# Patient Record
Sex: FEMALE | Race: WHITE | NOT HISPANIC OR LATINO | Employment: OTHER | URBAN - METROPOLITAN AREA
[De-identification: names, ages, dates, MRNs, and addresses within clinical notes are randomized per-mention and may not be internally consistent; named-entity substitution may affect disease eponyms.]

---

## 2023-03-18 PROBLEM — M48.061 SPINAL STENOSIS OF LUMBAR REGION: Status: ACTIVE | Noted: 2023-03-18

## 2023-03-18 PROBLEM — M54.9 BACK PAIN: Status: ACTIVE | Noted: 2023-03-18

## 2023-03-18 PROBLEM — M54.16 LUMBAR RADICULITIS: Status: ACTIVE | Noted: 2023-03-18

## 2023-03-18 PROBLEM — L65.9 HAIR THINNING: Status: ACTIVE | Noted: 2023-03-18

## 2023-03-18 PROBLEM — G89.29 CHRONIC BILATERAL LOW BACK PAIN WITH LEFT-SIDED SCIATICA: Status: ACTIVE | Noted: 2023-03-18

## 2023-03-18 PROBLEM — F90.0 ATTENTION DEFICIT HYPERACTIVITY DISORDER (ADHD), PREDOMINANTLY INATTENTIVE TYPE: Status: ACTIVE | Noted: 2023-03-18

## 2023-03-18 PROBLEM — L60.3 BRITTLE NAILS: Status: ACTIVE | Noted: 2023-03-18

## 2023-03-18 PROBLEM — M54.50 LOWER BACK PAIN: Status: ACTIVE | Noted: 2023-03-18

## 2023-03-18 PROBLEM — G89.29 CHRONIC NECK PAIN: Status: ACTIVE | Noted: 2023-03-18

## 2023-03-18 PROBLEM — E78.2 MIXED HYPERLIPIDEMIA: Status: ACTIVE | Noted: 2023-03-18

## 2023-03-18 PROBLEM — R21 SKIN RASH: Status: ACTIVE | Noted: 2023-03-18

## 2023-03-18 PROBLEM — F41.8 DEPRESSION WITH ANXIETY: Status: ACTIVE | Noted: 2023-03-18

## 2023-03-18 PROBLEM — S32.000A COMPRESSION FRACTURE OF LUMBAR VERTEBRA (MULTI): Status: ACTIVE | Noted: 2023-03-18

## 2023-03-18 PROBLEM — M54.42 CHRONIC BILATERAL LOW BACK PAIN WITH LEFT-SIDED SCIATICA: Status: ACTIVE | Noted: 2023-03-18

## 2023-03-18 PROBLEM — M25.551 RIGHT HIP PAIN: Status: ACTIVE | Noted: 2023-03-18

## 2023-03-18 PROBLEM — M79.18 MYOFASCIAL PAIN: Status: ACTIVE | Noted: 2023-03-18

## 2023-03-18 PROBLEM — M47.816 LUMBAR SPONDYLOSIS: Status: ACTIVE | Noted: 2023-03-18

## 2023-03-18 PROBLEM — E66.09 OBESITY DUE TO EXCESS CALORIES: Status: ACTIVE | Noted: 2023-03-18

## 2023-03-18 PROBLEM — F41.8 SITUATIONAL ANXIETY: Status: ACTIVE | Noted: 2023-03-18

## 2023-03-18 PROBLEM — R13.10 PAIN ON SWALLOWING: Status: ACTIVE | Noted: 2023-03-18

## 2023-03-18 PROBLEM — R53.83 FATIGUE: Status: ACTIVE | Noted: 2023-03-18

## 2023-03-18 PROBLEM — K21.9 GERD (GASTROESOPHAGEAL REFLUX DISEASE): Status: ACTIVE | Noted: 2023-03-18

## 2023-03-18 PROBLEM — R19.5 POSITIVE COLORECTAL CANCER SCREENING USING COLOGUARD TEST: Status: ACTIVE | Noted: 2023-03-18

## 2023-03-18 PROBLEM — M54.2 CHRONIC NECK PAIN: Status: ACTIVE | Noted: 2023-03-18

## 2023-03-18 RX ORDER — BUPROPION HYDROCHLORIDE 150 MG/1
1 TABLET ORAL DAILY
COMMUNITY
Start: 2019-09-26 | End: 2023-06-26

## 2023-03-18 RX ORDER — TRIAMCINOLONE ACETONIDE 5 MG/G
1 CREAM TOPICAL
COMMUNITY
Start: 2022-04-25 | End: 2023-04-11 | Stop reason: ALTCHOICE

## 2023-03-18 RX ORDER — OMEPRAZOLE 20 MG/1
1 CAPSULE, DELAYED RELEASE ORAL
COMMUNITY
Start: 2020-03-03 | End: 2023-04-11 | Stop reason: ALTCHOICE

## 2023-03-18 RX ORDER — DEXTROAMPHETAMINE SACCHARATE, AMPHETAMINE ASPARTATE, DEXTROAMPHETAMINE SULFATE AND AMPHETAMINE SULFATE 7.5; 7.5; 7.5; 7.5 MG/1; MG/1; MG/1; MG/1
1 TABLET ORAL 2 TIMES DAILY
COMMUNITY
End: 2023-03-24 | Stop reason: SDUPTHER

## 2023-03-18 RX ORDER — BUPROPION HYDROCHLORIDE 300 MG/1
1 TABLET ORAL DAILY
COMMUNITY
Start: 2020-09-22 | End: 2023-11-06 | Stop reason: SDUPTHER

## 2023-03-18 RX ORDER — PROGESTERONE 200 MG/1
1 CAPSULE ORAL DAILY
COMMUNITY
Start: 2021-12-30 | End: 2023-05-04

## 2023-03-18 RX ORDER — MELOXICAM 15 MG/1
1 TABLET ORAL DAILY
COMMUNITY
Start: 2021-11-04 | End: 2023-04-24

## 2023-03-18 RX ORDER — CYCLOBENZAPRINE HCL 10 MG
1 TABLET ORAL NIGHTLY
COMMUNITY
Start: 2022-08-31 | End: 2023-10-05 | Stop reason: SDUPTHER

## 2023-03-18 RX ORDER — ATORVASTATIN CALCIUM 40 MG/1
1 TABLET, FILM COATED ORAL DAILY
COMMUNITY
Start: 2020-09-22 | End: 2023-05-11 | Stop reason: WASHOUT

## 2023-03-24 ENCOUNTER — APPOINTMENT (OUTPATIENT)
Dept: PRIMARY CARE | Facility: CLINIC | Age: 68
End: 2023-03-24
Payer: MEDICARE

## 2023-03-24 DIAGNOSIS — F90.0 ATTENTION DEFICIT HYPERACTIVITY DISORDER (ADHD), PREDOMINANTLY INATTENTIVE TYPE: Primary | ICD-10-CM

## 2023-03-24 RX ORDER — DEXTROAMPHETAMINE SACCHARATE, AMPHETAMINE ASPARTATE, DEXTROAMPHETAMINE SULFATE AND AMPHETAMINE SULFATE 3.75; 3.75; 3.75; 3.75 MG/1; MG/1; MG/1; MG/1
2 TABLET ORAL DAILY
COMMUNITY
Start: 2022-10-07 | End: 2023-04-11 | Stop reason: WASHOUT

## 2023-03-24 RX ORDER — DEXTROAMPHETAMINE SACCHARATE, AMPHETAMINE ASPARTATE, DEXTROAMPHETAMINE SULFATE AND AMPHETAMINE SULFATE 7.5; 7.5; 7.5; 7.5 MG/1; MG/1; MG/1; MG/1
1 TABLET ORAL 2 TIMES DAILY
Qty: 60 TABLET | Refills: 0 | Status: SHIPPED | OUTPATIENT
Start: 2023-03-24 | End: 2023-05-11 | Stop reason: WASHOUT

## 2023-03-24 RX ORDER — LORAZEPAM 0.5 MG/1
TABLET ORAL
COMMUNITY
Start: 2022-04-25 | End: 2023-04-11 | Stop reason: WASHOUT

## 2023-04-11 ENCOUNTER — OFFICE VISIT (OUTPATIENT)
Dept: PRIMARY CARE | Facility: CLINIC | Age: 68
End: 2023-04-11
Payer: MEDICARE

## 2023-04-11 VITALS
HEART RATE: 94 BPM | TEMPERATURE: 97.8 F | SYSTOLIC BLOOD PRESSURE: 111 MMHG | DIASTOLIC BLOOD PRESSURE: 69 MMHG | RESPIRATION RATE: 21 BRPM

## 2023-04-11 DIAGNOSIS — K21.9 GASTROESOPHAGEAL REFLUX DISEASE WITHOUT ESOPHAGITIS: Primary | ICD-10-CM

## 2023-04-11 DIAGNOSIS — E66.09 CLASS 1 OBESITY DUE TO EXCESS CALORIES WITHOUT SERIOUS COMORBIDITY WITH BODY MASS INDEX (BMI) OF 33.0 TO 33.9 IN ADULT: ICD-10-CM

## 2023-04-11 DIAGNOSIS — R49.0 HOARSENESS OF VOICE: ICD-10-CM

## 2023-04-11 DIAGNOSIS — R21 SKIN RASH: ICD-10-CM

## 2023-04-11 PROBLEM — E66.811 CLASS 1 OBESITY DUE TO EXCESS CALORIES WITHOUT SERIOUS COMORBIDITY WITH BODY MASS INDEX (BMI) OF 33.0 TO 33.9 IN ADULT: Status: ACTIVE | Noted: 2023-03-18

## 2023-04-11 PROCEDURE — 99214 OFFICE O/P EST MOD 30 MIN: CPT | Performed by: INTERNAL MEDICINE

## 2023-04-11 PROCEDURE — 1160F RVW MEDS BY RX/DR IN RCRD: CPT | Performed by: INTERNAL MEDICINE

## 2023-04-11 PROCEDURE — 1159F MED LIST DOCD IN RCRD: CPT | Performed by: INTERNAL MEDICINE

## 2023-04-11 PROCEDURE — 3008F BODY MASS INDEX DOCD: CPT | Performed by: INTERNAL MEDICINE

## 2023-04-11 RX ORDER — TRIAMCINOLONE ACETONIDE 1 MG/G
OINTMENT TOPICAL 2 TIMES DAILY PRN
Qty: 15 G | Refills: 0 | Status: SHIPPED | OUTPATIENT
Start: 2023-04-11 | End: 2023-08-09

## 2023-04-11 RX ORDER — OMEPRAZOLE 20 MG/1
20 CAPSULE, DELAYED RELEASE ORAL DAILY
Qty: 30 CAPSULE | Refills: 1 | Status: SHIPPED | OUTPATIENT
Start: 2023-04-11 | End: 2023-10-05 | Stop reason: SDUPTHER

## 2023-04-11 ASSESSMENT — ENCOUNTER SYMPTOMS
ABDOMINAL DISTENTION: 0
VOMITING: 0
CONSTIPATION: 0
RECTAL PAIN: 0
VOICE CHANGE: 1
NAUSEA: 0
COUGH: 0
DIARRHEA: 0
BLOOD IN STOOL: 0
SHORTNESS OF BREATH: 0
ABDOMINAL PAIN: 0

## 2023-04-11 NOTE — PROGRESS NOTES
Subjective   Patient ID: Thea Kay is a 68 y.o. female who presents for Sinusitis, froggy throat, and rash on leg.    Sinusitis  Pertinent negatives include no coughing or shortness of breath.     Pt here in acute visit.      She is having issues with hoarseness and raspy of her voice.  She tells me people will call her and ask if she just woke up.  She always has a bad taste in her mouth and per patient doesn't taste much.  She has history of GERD.  She is not currently on PPI therapy.      She also has some chronic sinus/allergy issues around this time of year.  She is just starting to note some drainage.      She has an itchy spot of the lateral aspect of her left calf.  She thought she felt sometime like a bug bite.  The area comes and goes.      She also is asking about weight loss.  She has tried WW in the past with good results.  She is asking about ozempic.        Review of Systems   HENT:  Positive for postnasal drip and voice change.         Itchy ears   Bad taste in mouth    Respiratory:  Negative for cough and shortness of breath.    Gastrointestinal:  Negative for abdominal distention, abdominal pain, blood in stool, constipation, diarrhea, nausea, rectal pain and vomiting.   Skin:  Negative for rash.       Objective   /69   Pulse 94   Temp 36.6 °C (97.8 °F)   Resp 21    Physical Exam  Constitutional:       Appearance: Normal appearance.   HENT:      Head:      Comments: Dry canals bilaterally      Right Ear: Tympanic membrane normal.      Left Ear: Tympanic membrane normal.      Nose: Nose normal.      Mouth/Throat:      Mouth: Mucous membranes are dry.      Pharynx: No oropharyngeal exudate or posterior oropharyngeal erythema.   Cardiovascular:      Rate and Rhythm: Normal rate and regular rhythm.      Heart sounds: Normal heart sounds.   Pulmonary:      Effort: Pulmonary effort is normal.      Breath sounds: Normal breath sounds.   Abdominal:      General: Bowel sounds are normal. There  is no distension.      Palpations: Abdomen is soft.      Tenderness: There is no abdominal tenderness.   Skin:     Findings: Lesion present. No rash.   Neurological:      Mental Status: She is alert and oriented to person, place, and time.         Assessment/Plan   Problem List Items Addressed This Visit          Digestive    GERD (gastroesophageal reflux disease) - Primary    Relevant Medications    omeprazole (PriLOSEC) 20 mg DR capsule       Endocrine/Metabolic    Class 1 obesity due to excess calories without serious comorbidity with body mass index (BMI) of 33.0 to 33.9 in adult     Discussed that she is not eligible for Ozempic and other DM meds at this time due to not having DM  She has had success in past with WW and she will return to this program to see if this will provide sustainable weight loss             Other    Skin rash    Relevant Medications    triamcinolone (Kenalog) 0.1 % ointment    Hoarseness of voice     This is most likely from silent reflux   Would like her to try PPI for 1 month and if this does not help will do referral to ENT for further evaluation of vocal cords   Discussed need for diet modifications          Relevant Medications    omeprazole (PriLOSEC) 20 mg DR capsule

## 2023-04-11 NOTE — ASSESSMENT & PLAN NOTE
Discussed that she is not eligible for Ozempic and other DM meds at this time due to not having DM  She has had success in past with WW and she will return to this program to see if this will provide sustainable weight loss

## 2023-04-11 NOTE — ASSESSMENT & PLAN NOTE
This is most likely from silent reflux   Would like her to try PPI for 1 month and if this does not help will do referral to ENT for further evaluation of vocal cords   Discussed need for diet modifications

## 2023-04-13 PROBLEM — M51.36 DEGENERATION OF LUMBAR INTERVERTEBRAL DISC: Status: ACTIVE | Noted: 2023-04-13

## 2023-04-13 PROBLEM — M53.3 DISORDER OF SACRUM: Status: ACTIVE | Noted: 2023-04-13

## 2023-04-13 PROBLEM — E78.5 HYPERLIPIDEMIA: Status: ACTIVE | Noted: 2023-04-13

## 2023-04-13 PROBLEM — K21.9 GASTROESOPHAGEAL REFLUX DISEASE WITHOUT ESOPHAGITIS: Status: ACTIVE | Noted: 2017-08-10

## 2023-04-13 PROBLEM — M51.369 DEGENERATION OF LUMBAR INTERVERTEBRAL DISC: Status: ACTIVE | Noted: 2023-04-13

## 2023-04-13 PROBLEM — F32.A DEPRESSION: Status: ACTIVE | Noted: 2023-04-13

## 2023-04-13 PROBLEM — M47.817 LUMBOSACRAL SPONDYLOSIS WITHOUT MYELOPATHY: Status: ACTIVE | Noted: 2023-04-13

## 2023-04-13 PROBLEM — G89.29 CHRONIC PAIN: Status: ACTIVE | Noted: 2023-04-13

## 2023-04-13 RX ORDER — CALCIUM CARBONATE/VITAMIN D3 600MG-5MCG
TABLET ORAL
COMMUNITY
End: 2023-05-11 | Stop reason: SDUPTHER

## 2023-04-13 RX ORDER — BUPROPION HYDROCHLORIDE 150 MG/1
TABLET ORAL EVERY 24 HOURS
COMMUNITY
End: 2023-05-01 | Stop reason: SDUPTHER

## 2023-04-13 RX ORDER — GABAPENTIN 300 MG/1
300 CAPSULE ORAL 2 TIMES DAILY
COMMUNITY
End: 2023-05-11 | Stop reason: ALTCHOICE

## 2023-04-13 RX ORDER — OMEPRAZOLE 20 MG/1
CAPSULE, DELAYED RELEASE ORAL EVERY 24 HOURS
COMMUNITY
End: 2023-05-11 | Stop reason: SDUPTHER

## 2023-04-13 RX ORDER — CHOLECALCIFEROL (VITAMIN D3) 25 MCG
TABLET ORAL
COMMUNITY

## 2023-04-13 RX ORDER — DEXTROAMPHETAMINE SACCHARATE, AMPHETAMINE ASPARTATE, DEXTROAMPHETAMINE SULFATE AND AMPHETAMINE SULFATE 7.5; 7.5; 7.5; 7.5 MG/1; MG/1; MG/1; MG/1
30 TABLET ORAL 2 TIMES DAILY
COMMUNITY
Start: 2015-08-14 | End: 2023-06-26 | Stop reason: SDUPTHER

## 2023-04-13 RX ORDER — METHYLPREDNISOLONE 4 MG/1
TABLET ORAL
COMMUNITY
Start: 2018-02-16 | End: 2023-05-11 | Stop reason: ALTCHOICE

## 2023-04-13 RX ORDER — DEXTROAMPHETAMINE SACCHARATE, AMPHETAMINE ASPARTATE, DEXTROAMPHETAMINE SULFATE AND AMPHETAMINE SULFATE 5; 5; 5; 5 MG/1; MG/1; MG/1; MG/1
TABLET ORAL
COMMUNITY
Start: 2018-04-25 | End: 2023-05-11 | Stop reason: WASHOUT

## 2023-04-13 RX ORDER — CYCLOBENZAPRINE HCL 10 MG
10 TABLET ORAL
COMMUNITY
Start: 2016-09-25 | End: 2023-05-11 | Stop reason: SDUPTHER

## 2023-04-13 RX ORDER — FLUOXETINE HYDROCHLORIDE 20 MG/1
40 CAPSULE ORAL
COMMUNITY
Start: 2010-02-23 | End: 2023-05-11 | Stop reason: ALTCHOICE

## 2023-04-13 RX ORDER — ATORVASTATIN CALCIUM 40 MG/1
40 TABLET, FILM COATED ORAL
COMMUNITY
Start: 2017-12-27 | End: 2023-10-20

## 2023-04-13 RX ORDER — BUPROPION HYDROCHLORIDE 150 MG/1
450 TABLET, EXTENDED RELEASE ORAL
COMMUNITY
End: 2023-05-11 | Stop reason: ENTERED-IN-ERROR

## 2023-04-13 RX ORDER — CHOLECALCIFEROL (VITAMIN D3) 125 MCG/ML
DROPS ORAL EVERY 24 HOURS
COMMUNITY
End: 2023-05-11 | Stop reason: SDUPTHER

## 2023-04-13 RX ORDER — VITAMIN E 268 MG
CAPSULE ORAL
COMMUNITY
End: 2023-05-11 | Stop reason: WASHOUT

## 2023-04-13 RX ORDER — PROGESTERONE 100 MG/1
CAPSULE ORAL
COMMUNITY
End: 2023-05-11

## 2023-04-13 RX ORDER — MELATONIN 3 MG
TABLET ORAL
COMMUNITY
End: 2023-05-11 | Stop reason: ALTCHOICE

## 2023-04-13 RX ORDER — LORATADINE 10 MG/1
10 TABLET ORAL DAILY PRN
COMMUNITY
Start: 2017-09-07

## 2023-04-13 RX ORDER — OMEPRAZOLE 20 MG/1
TABLET, DELAYED RELEASE ORAL
COMMUNITY
End: 2023-05-11 | Stop reason: SDUPTHER

## 2023-04-13 RX ORDER — ONDANSETRON 8 MG/1
8 TABLET, ORALLY DISINTEGRATING ORAL EVERY 8 HOURS PRN
COMMUNITY
Start: 2018-05-24 | End: 2023-10-05 | Stop reason: ALTCHOICE

## 2023-04-13 RX ORDER — NICOTINE POLACRILEX 2 MG
GUM BUCCAL
COMMUNITY
End: 2023-05-11 | Stop reason: WASHOUT

## 2023-04-22 DIAGNOSIS — M54.2 CHRONIC NECK PAIN: ICD-10-CM

## 2023-04-22 DIAGNOSIS — K21.9 GASTROESOPHAGEAL REFLUX DISEASE, UNSPECIFIED WHETHER ESOPHAGITIS PRESENT: ICD-10-CM

## 2023-04-22 DIAGNOSIS — G89.29 CHRONIC NECK PAIN: ICD-10-CM

## 2023-04-24 RX ORDER — MELOXICAM 15 MG/1
TABLET ORAL
Qty: 90 TABLET | Refills: 3 | Status: SHIPPED | OUTPATIENT
Start: 2023-04-24 | End: 2024-02-12

## 2023-04-24 RX ORDER — OMEPRAZOLE 20 MG/1
CAPSULE, DELAYED RELEASE ORAL
Qty: 90 CAPSULE | Refills: 3 | Status: SHIPPED | OUTPATIENT
Start: 2023-04-24 | End: 2024-02-12

## 2023-05-01 DIAGNOSIS — F41.8 SITUATIONAL ANXIETY: Primary | ICD-10-CM

## 2023-05-01 RX ORDER — BUPROPION HYDROCHLORIDE 150 MG/1
150 TABLET ORAL EVERY 24 HOURS
Qty: 90 TABLET | Refills: 1 | Status: SHIPPED | OUTPATIENT
Start: 2023-05-01 | End: 2023-05-11 | Stop reason: SDUPTHER

## 2023-05-03 DIAGNOSIS — E28.319 PREMATURE MENOPAUSE: Primary | ICD-10-CM

## 2023-05-04 RX ORDER — PROGESTERONE 200 MG/1
CAPSULE ORAL
Qty: 90 CAPSULE | Refills: 3 | Status: SHIPPED | OUTPATIENT
Start: 2023-05-04 | End: 2024-04-08

## 2023-05-11 ENCOUNTER — OFFICE VISIT (OUTPATIENT)
Dept: PRIMARY CARE | Facility: CLINIC | Age: 68
End: 2023-05-11
Payer: MEDICARE

## 2023-05-11 VITALS
RESPIRATION RATE: 20 BRPM | HEIGHT: 61 IN | HEART RATE: 78 BPM | BODY MASS INDEX: 35.87 KG/M2 | WEIGHT: 190 LBS | DIASTOLIC BLOOD PRESSURE: 80 MMHG | SYSTOLIC BLOOD PRESSURE: 118 MMHG

## 2023-05-11 DIAGNOSIS — R49.0 HOARSENESS OF VOICE: ICD-10-CM

## 2023-05-11 DIAGNOSIS — Z79.899 MEDICATION MANAGEMENT: ICD-10-CM

## 2023-05-11 DIAGNOSIS — R41.3 MEMORY CHANGES: ICD-10-CM

## 2023-05-11 DIAGNOSIS — M48.061 SPINAL STENOSIS OF LUMBAR REGION, UNSPECIFIED WHETHER NEUROGENIC CLAUDICATION PRESENT: ICD-10-CM

## 2023-05-11 DIAGNOSIS — F43.10 PTSD (POST-TRAUMATIC STRESS DISORDER): ICD-10-CM

## 2023-05-11 DIAGNOSIS — F41.8 SITUATIONAL ANXIETY: ICD-10-CM

## 2023-05-11 DIAGNOSIS — F41.8 DEPRESSION WITH ANXIETY: ICD-10-CM

## 2023-05-11 DIAGNOSIS — M25.552 PAIN OF BOTH HIP JOINTS: ICD-10-CM

## 2023-05-11 DIAGNOSIS — Z78.0 POST-MENOPAUSAL: ICD-10-CM

## 2023-05-11 DIAGNOSIS — E66.01 CLASS 2 SEVERE OBESITY DUE TO EXCESS CALORIES WITH SERIOUS COMORBIDITY AND BODY MASS INDEX (BMI) OF 35.0 TO 35.9 IN ADULT (MULTI): ICD-10-CM

## 2023-05-11 DIAGNOSIS — K21.9 GASTROESOPHAGEAL REFLUX DISEASE WITHOUT ESOPHAGITIS: ICD-10-CM

## 2023-05-11 DIAGNOSIS — M48.02 CERVICAL STENOSIS OF SPINE: ICD-10-CM

## 2023-05-11 DIAGNOSIS — F51.02 ADJUSTMENT INSOMNIA: ICD-10-CM

## 2023-05-11 DIAGNOSIS — R21 SKIN RASH: ICD-10-CM

## 2023-05-11 DIAGNOSIS — E78.2 MIXED HYPERLIPIDEMIA: ICD-10-CM

## 2023-05-11 DIAGNOSIS — M54.2 CHRONIC NECK PAIN: Primary | ICD-10-CM

## 2023-05-11 DIAGNOSIS — R73.9 HYPERGLYCEMIA: ICD-10-CM

## 2023-05-11 DIAGNOSIS — G47.419 PRIMARY NARCOLEPSY WITHOUT CATAPLEXY (HHS-HCC): ICD-10-CM

## 2023-05-11 DIAGNOSIS — E28.319 PREMATURE MENOPAUSE: ICD-10-CM

## 2023-05-11 DIAGNOSIS — F90.0 ATTENTION DEFICIT HYPERACTIVITY DISORDER (ADHD), PREDOMINANTLY INATTENTIVE TYPE: ICD-10-CM

## 2023-05-11 DIAGNOSIS — G89.29 CHRONIC NECK PAIN: Primary | ICD-10-CM

## 2023-05-11 DIAGNOSIS — M25.551 PAIN OF BOTH HIP JOINTS: ICD-10-CM

## 2023-05-11 PROBLEM — M53.3 DISORDER OF SACRUM: Status: RESOLVED | Noted: 2023-04-13 | Resolved: 2023-05-11

## 2023-05-11 PROBLEM — S32.000A COMPRESSION FRACTURE OF LUMBAR VERTEBRA (MULTI): Status: RESOLVED | Noted: 2023-03-18 | Resolved: 2023-05-11

## 2023-05-11 PROBLEM — M79.18 MYOFASCIAL PAIN: Status: RESOLVED | Noted: 2023-03-18 | Resolved: 2023-05-11

## 2023-05-11 PROBLEM — R19.5 POSITIVE COLORECTAL CANCER SCREENING USING COLOGUARD TEST: Status: RESOLVED | Noted: 2023-03-18 | Resolved: 2023-05-11

## 2023-05-11 PROBLEM — E66.09 CLASS 1 OBESITY DUE TO EXCESS CALORIES WITHOUT SERIOUS COMORBIDITY WITH BODY MASS INDEX (BMI) OF 33.0 TO 33.9 IN ADULT: Status: RESOLVED | Noted: 2023-03-18 | Resolved: 2023-05-11

## 2023-05-11 PROBLEM — L65.9 HAIR THINNING: Status: RESOLVED | Noted: 2023-03-18 | Resolved: 2023-05-11

## 2023-05-11 PROBLEM — M47.816 LUMBAR SPONDYLOSIS: Status: RESOLVED | Noted: 2023-03-18 | Resolved: 2023-05-11

## 2023-05-11 PROBLEM — E66.811 CLASS 1 OBESITY DUE TO EXCESS CALORIES WITHOUT SERIOUS COMORBIDITY WITH BODY MASS INDEX (BMI) OF 33.0 TO 33.9 IN ADULT: Status: RESOLVED | Noted: 2023-03-18 | Resolved: 2023-05-11

## 2023-05-11 PROBLEM — E66.812 CLASS 2 OBESITY DUE TO EXCESS CALORIES WITH BODY MASS INDEX (BMI) OF 35.0 TO 35.9 IN ADULT: Status: ACTIVE | Noted: 2023-03-18

## 2023-05-11 PROBLEM — R53.83 FATIGUE: Status: RESOLVED | Noted: 2023-03-18 | Resolved: 2023-05-11

## 2023-05-11 PROCEDURE — 1159F MED LIST DOCD IN RCRD: CPT | Performed by: INTERNAL MEDICINE

## 2023-05-11 PROCEDURE — 1160F RVW MEDS BY RX/DR IN RCRD: CPT | Performed by: INTERNAL MEDICINE

## 2023-05-11 PROCEDURE — 99214 OFFICE O/P EST MOD 30 MIN: CPT | Performed by: INTERNAL MEDICINE

## 2023-05-11 PROCEDURE — G0439 PPPS, SUBSEQ VISIT: HCPCS | Performed by: INTERNAL MEDICINE

## 2023-05-11 PROCEDURE — 3008F BODY MASS INDEX DOCD: CPT | Performed by: INTERNAL MEDICINE

## 2023-05-11 PROCEDURE — 1036F TOBACCO NON-USER: CPT | Performed by: INTERNAL MEDICINE

## 2023-05-11 PROCEDURE — 1170F FXNL STATUS ASSESSED: CPT | Performed by: INTERNAL MEDICINE

## 2023-05-11 ASSESSMENT — ENCOUNTER SYMPTOMS
HEADACHES: 0
WEAKNESS: 0
CHILLS: 0
SLEEP DISTURBANCE: 1
MYALGIAS: 0
OCCASIONAL FEELINGS OF UNSTEADINESS: 0
NAUSEA: 0
ARTHRALGIAS: 0
FLANK PAIN: 0
SEIZURES: 0
LOSS OF SENSATION IN FEET: 0
BACK PAIN: 1
DIARRHEA: 0
VOICE CHANGE: 1
WOUND: 0
ABDOMINAL DISTENTION: 0
SINUS PRESSURE: 0
CONFUSION: 0
TREMORS: 0
FATIGUE: 0
AGITATION: 0
EYE ITCHING: 0
DEPRESSION: 0
SHORTNESS OF BREATH: 0
DYSPHORIC MOOD: 0
HALLUCINATIONS: 0
ANAL BLEEDING: 0
CHOKING: 0
LIGHT-HEADEDNESS: 0
CONSTIPATION: 1
DIFFICULTY URINATING: 0
PALPITATIONS: 0
NECK PAIN: 0
EYE PAIN: 0
HEMATURIA: 0
APNEA: 0
DYSURIA: 0
POLYPHAGIA: 0
EYE REDNESS: 0
JOINT SWELLING: 0
RECTAL PAIN: 0
ADENOPATHY: 0
FREQUENCY: 0
BLOOD IN STOOL: 0
NERVOUS/ANXIOUS: 0
HYPERACTIVE: 0
WHEEZING: 0
PHOTOPHOBIA: 0
STRIDOR: 0
FACIAL ASYMMETRY: 0
SPEECH DIFFICULTY: 0
DIZZINESS: 0
NUMBNESS: 0
UNEXPECTED WEIGHT CHANGE: 0
ABDOMINAL PAIN: 0
RHINORRHEA: 0
SINUS PAIN: 0
CHEST TIGHTNESS: 0
POLYDIPSIA: 0
COUGH: 0
BRUISES/BLEEDS EASILY: 0
FACIAL SWELLING: 0
COLOR CHANGE: 0
FEVER: 0
DECREASED CONCENTRATION: 0
SORE THROAT: 1
EYE DISCHARGE: 0
APPETITE CHANGE: 0
NECK STIFFNESS: 0
ACTIVITY CHANGE: 0
VOMITING: 0
TROUBLE SWALLOWING: 0
DIAPHORESIS: 0

## 2023-05-11 ASSESSMENT — ACTIVITIES OF DAILY LIVING (ADL)
DRESSING: INDEPENDENT
MANAGING_FINANCES: INDEPENDENT
TAKING_MEDICATION: INDEPENDENT
GROCERY_SHOPPING: INDEPENDENT
BATHING: INDEPENDENT
DOING_HOUSEWORK: INDEPENDENT

## 2023-05-11 ASSESSMENT — PATIENT HEALTH QUESTIONNAIRE - PHQ9
1. LITTLE INTEREST OR PLEASURE IN DOING THINGS: NOT AT ALL
SUM OF ALL RESPONSES TO PHQ9 QUESTIONS 1 AND 2: 0
2. FEELING DOWN, DEPRESSED OR HOPELESS: NOT AT ALL

## 2023-05-11 NOTE — PROGRESS NOTES
Subjective   Reason for Visit: Thea Kay is an 68 y.o. female here for a Medicare Wellness visit.     Past Medical, Surgical, and Family History reviewed and updated in chart.    Reviewed all medications by prescribing practitioner or clinical pharmacist (such as prescriptions, OTCs, herbal therapies and supplements) and documented in the medical record.    HPI    Pt here for MWV.  She admits to poor diet and not exercising regularly.  She had flu shot in fall and Covid vaccine plus 1 booster.  She had pneumonia shot in 2022.  She has Zostavax but not Shingrix.      She still has sensation of something wrong in her throat.  She was already on Omeprazole prior to our last visit so she has seen no changes in hoarseness.      She follows with pain management-Dr. Griffin.  She is having RFA to her hips/sacral area.  She also has had epidural injections into lumbar spine.   She also sees Dr. Simms (neurosurgeon).      She had colonoscopy in 2020 and did have two tubular adenoma polyps removed.  She will be due in 5 years.  Has minor constipation at times.      She saw GYN last week and had pelvic exam.  No current issues.  She is in need of a diagnostic mammogram.  The GYN ordered this for her.      Mood is stable but her sleep is poor.  She has a hard time sleeping due to pain.  She has some minor memory changes with forgetting peoples names.  She recognizes them but cannot think of their name.  No other memory loss noted.      Patient Care Team:  Ofe THOMAS DO as PCP - General  Ofe THOMAS DO as PCP - [facility] Attributed Provider     Review of Systems   Constitutional:  Negative for activity change, appetite change, chills, diaphoresis, fatigue, fever and unexpected weight change.   HENT:  Positive for hearing loss, sore throat and voice change. Negative for congestion, dental problem, drooling, ear discharge, ear pain, facial swelling, mouth sores, nosebleeds, postnasal drip, rhinorrhea, sinus pressure,  "sinus pain, sneezing, tinnitus and trouble swallowing.    Eyes:  Positive for visual disturbance (wears glasses--due for exam). Negative for photophobia, pain, discharge, redness and itching.   Respiratory:  Negative for apnea, cough, choking, chest tightness, shortness of breath, wheezing and stridor.    Cardiovascular:  Negative for chest pain, palpitations and leg swelling.   Gastrointestinal:  Positive for constipation. Negative for abdominal distention, abdominal pain, anal bleeding, blood in stool, diarrhea, nausea, rectal pain and vomiting.   Endocrine: Negative for cold intolerance, heat intolerance, polydipsia, polyphagia and polyuria.   Genitourinary:  Negative for decreased urine volume, difficulty urinating, dyspareunia, dysuria, enuresis, flank pain, frequency, genital sores, hematuria, menstrual problem, pelvic pain, urgency, vaginal bleeding, vaginal discharge and vaginal pain.   Musculoskeletal:  Positive for back pain. Negative for arthralgias, gait problem, joint swelling, myalgias, neck pain and neck stiffness.   Skin:  Negative for color change, pallor, rash and wound.   Allergic/Immunologic: Negative for environmental allergies, food allergies and immunocompromised state.   Neurological:  Negative for dizziness, tremors, seizures, syncope, facial asymmetry, speech difficulty, weakness, light-headedness, numbness and headaches.   Hematological:  Negative for adenopathy. Does not bruise/bleed easily.   Psychiatric/Behavioral:  Positive for sleep disturbance. Negative for agitation, behavioral problems, confusion, decreased concentration, dysphoric mood, hallucinations, self-injury and suicidal ideas. The patient is not nervous/anxious and is not hyperactive.        Objective   Vitals:  /80   Pulse 78   Resp 20   Ht 1.549 m (5' 1\")   Wt 86.2 kg (190 lb)   BMI 35.90 kg/m²       Physical Exam  Constitutional:       Appearance: Normal appearance.   HENT:      Head: Normocephalic and " atraumatic.      Right Ear: Tympanic membrane, ear canal and external ear normal. There is no impacted cerumen.      Left Ear: Tympanic membrane, ear canal and external ear normal. There is no impacted cerumen.      Nose: Nose normal. No congestion or rhinorrhea.      Mouth/Throat:      Mouth: Mucous membranes are moist.      Pharynx: Oropharynx is clear. No oropharyngeal exudate or posterior oropharyngeal erythema.   Eyes:      Extraocular Movements: Extraocular movements intact.      Conjunctiva/sclera: Conjunctivae normal.      Pupils: Pupils are equal, round, and reactive to light.   Neck:      Vascular: No carotid bruit.   Cardiovascular:      Rate and Rhythm: Normal rate and regular rhythm.      Pulses: Normal pulses.      Heart sounds: Normal heart sounds. No murmur heard.  Pulmonary:      Effort: Pulmonary effort is normal. No respiratory distress.      Breath sounds: Normal breath sounds. No wheezing, rhonchi or rales.   Abdominal:      General: Abdomen is flat. Bowel sounds are normal. There is no distension.      Palpations: Abdomen is soft.      Tenderness: There is no abdominal tenderness.      Hernia: No hernia is present.   Musculoskeletal:         General: Tenderness present. No swelling, deformity or signs of injury.      Cervical back: Normal range of motion and neck supple.      Right lower leg: No edema.      Left lower leg: No edema.      Comments: Pt with tenderness to both hips/low back region with active ROM    Lymphadenopathy:      Cervical: No cervical adenopathy.   Skin:     General: Skin is warm and dry.      Findings: No lesion or rash.   Neurological:      General: No focal deficit present.      Mental Status: She is alert and oriented to person, place, and time.      Cranial Nerves: No cranial nerve deficit.      Sensory: No sensory deficit.      Motor: No weakness.   Psychiatric:         Mood and Affect: Mood normal.         Behavior: Behavior normal.         Thought Content: Thought  content normal.         Judgment: Judgment normal.         Assessment/Plan   Problem List Items Addressed This Visit          Nervous    Chronic neck pain - Primary    Spinal stenosis of lumbar region    Current Assessment & Plan     Sees pain management  Having RFA         Insomnia    Narcolepsy    Cervical stenosis of spine    Current Assessment & Plan     Has had follow up with neurosurgeon Dr. Simms             Digestive    GERD (gastroesophageal reflux disease)    Current Assessment & Plan     On PPI therapy  Has hoarseness to voice          Relevant Orders    CBC       Musculoskeletal    Joint pain, hip       Endocrine/Metabolic    Class 2 obesity due to excess calories with body mass index (BMI) of 35.0 to 35.9 in adult    Current Assessment & Plan     She is planning to return to  program  We will test sugar and see if diabetic   She will look into if Wegovy is covered             Other    Attention deficit hyperactivity disorder (ADHD), predominantly inattentive type    Current Assessment & Plan     On Adderall daily  CSA done today, OARRS reviewed and appropriate  Will check urine with upcoming labs-order given          Relevant Orders    Follow Up In Primary Care    Depression with anxiety    Current Assessment & Plan     Mood is stable on current regimen          Relevant Orders    Follow Up In Primary Care    Mixed hyperlipidemia    Relevant Orders    Comprehensive Metabolic Panel    Lipid Panel    Follow Up In Primary Care    Situational anxiety    Skin rash    Hoarseness of voice    Current Assessment & Plan     This may be from uncontrolled GERD  She wishes to see ENT as she believes may be allergy based           Relevant Orders    Follow Up In Primary Care    Referral to ENT    Premature menopause    Current Assessment & Plan     On progesterone 200 mg given originally by Dr. Liz Robb (GYN)          PTSD (post-traumatic stress disorder)     Other Visit Diagnoses       Hyperglycemia         Relevant Orders    Hemoglobin A1C    Memory changes        Relevant Orders    Vitamin B12    Post-menopausal        Relevant Orders    XR DEXA bone density    Medication management        Relevant Orders    Toxicology screen, serum    Amphetamine Confirm, Urine

## 2023-05-11 NOTE — ASSESSMENT & PLAN NOTE
She is planning to return to WW program  We will test sugar and see if diabetic   She will look into if Wegovy is covered

## 2023-05-11 NOTE — ASSESSMENT & PLAN NOTE
On Adderall daily  CSA done today, OARRS reviewed and appropriate  Will check urine with upcoming labs-order given

## 2023-05-26 ENCOUNTER — PATIENT OUTREACH (OUTPATIENT)
Dept: CARE COORDINATION | Facility: CLINIC | Age: 68
End: 2023-05-26
Payer: MEDICARE

## 2023-05-26 NOTE — PROGRESS NOTES
Discharge Facility: [facility]  Discharge Diagnosis: Acute cholecystitis  Admission Date: 5/22/2023  Discharge Date: 5/25/2023    PCP Appointment Date: Unable to reach patient x2 attempts. Message sent to office pool to attempt to follow up to schedule patient.    Hospital Encounter and Summary: Linked

## 2023-06-07 ENCOUNTER — PATIENT OUTREACH (OUTPATIENT)
Dept: CARE COORDINATION | Facility: CLINIC | Age: 68
End: 2023-06-07
Payer: MEDICARE

## 2023-06-07 NOTE — PROGRESS NOTES
14 day call back complete.  At time of outreach call the patient feels as if their condition has improved since last visit.  Pt has not seen pcp at this time. Pt denies any questions or concerns.

## 2023-06-23 DIAGNOSIS — F41.8 DEPRESSION WITH ANXIETY: ICD-10-CM

## 2023-06-26 DIAGNOSIS — F90.0 ATTENTION DEFICIT HYPERACTIVITY DISORDER (ADHD), PREDOMINANTLY INATTENTIVE TYPE: Primary | ICD-10-CM

## 2023-06-26 RX ORDER — DEXTROAMPHETAMINE SACCHARATE, AMPHETAMINE ASPARTATE, DEXTROAMPHETAMINE SULFATE AND AMPHETAMINE SULFATE 7.5; 7.5; 7.5; 7.5 MG/1; MG/1; MG/1; MG/1
30 TABLET ORAL 2 TIMES DAILY
Qty: 60 TABLET | Refills: 0 | Status: SHIPPED | OUTPATIENT
Start: 2023-06-26 | End: 2023-08-03 | Stop reason: SDUPTHER

## 2023-06-26 RX ORDER — BUPROPION HYDROCHLORIDE 150 MG/1
TABLET ORAL
Qty: 90 TABLET | Refills: 3 | Status: SHIPPED | OUTPATIENT
Start: 2023-06-26 | End: 2023-11-06 | Stop reason: SDUPTHER

## 2023-06-26 NOTE — TELEPHONE ENCOUNTER
Still having issue getting Adderall 30mg twice a day. Still out of stock everywhere, so she is asking can you prescribe something else? Her child is taking Mydais? And also heard of Vyvanse, but that might be in short supply too.     Nito    Please advise

## 2023-06-26 NOTE — TELEPHONE ENCOUNTER
DISregard last message, she just called and found a pharmacy that has Adderrall 30mg, I sent you refill

## 2023-07-20 ENCOUNTER — PATIENT OUTREACH (OUTPATIENT)
Dept: CARE COORDINATION | Facility: CLINIC | Age: 68
End: 2023-07-20
Payer: MEDICARE

## 2023-08-03 DIAGNOSIS — F90.0 ATTENTION DEFICIT HYPERACTIVITY DISORDER (ADHD), PREDOMINANTLY INATTENTIVE TYPE: ICD-10-CM

## 2023-08-05 RX ORDER — DEXTROAMPHETAMINE SACCHARATE, AMPHETAMINE ASPARTATE, DEXTROAMPHETAMINE SULFATE AND AMPHETAMINE SULFATE 7.5; 7.5; 7.5; 7.5 MG/1; MG/1; MG/1; MG/1
30 TABLET ORAL 2 TIMES DAILY
Qty: 60 TABLET | Refills: 0 | Status: SHIPPED | OUTPATIENT
Start: 2023-08-05 | End: 2023-09-05 | Stop reason: SDUPTHER

## 2023-09-05 DIAGNOSIS — F90.0 ATTENTION DEFICIT HYPERACTIVITY DISORDER (ADHD), PREDOMINANTLY INATTENTIVE TYPE: ICD-10-CM

## 2023-09-05 RX ORDER — DEXTROAMPHETAMINE SACCHARATE, AMPHETAMINE ASPARTATE, DEXTROAMPHETAMINE SULFATE AND AMPHETAMINE SULFATE 7.5; 7.5; 7.5; 7.5 MG/1; MG/1; MG/1; MG/1
30 TABLET ORAL 2 TIMES DAILY
Qty: 60 TABLET | Refills: 0 | Status: SHIPPED | OUTPATIENT
Start: 2023-09-05 | End: 2023-10-05 | Stop reason: SDUPTHER

## 2023-09-15 ENCOUNTER — TELEPHONE (OUTPATIENT)
Dept: PRIMARY CARE | Facility: CLINIC | Age: 68
End: 2023-09-15
Payer: MEDICARE

## 2023-09-15 NOTE — TELEPHONE ENCOUNTER
Not likely thrush but instead what we see happen when you bite and injure the mucosa   Would just do some warm salt water gargles

## 2023-09-15 NOTE — TELEPHONE ENCOUNTER
Thea amador thinks has thrush in her mouth, only change is she cortisone shots for back, and finished a steroid pack too. No antiobotics, Has a  film on the inside of her right cheek. She did bite that same side few weeks ago.

## 2023-10-05 ENCOUNTER — OFFICE VISIT (OUTPATIENT)
Dept: PRIMARY CARE | Facility: CLINIC | Age: 68
End: 2023-10-05
Payer: MEDICARE

## 2023-10-05 VITALS
DIASTOLIC BLOOD PRESSURE: 70 MMHG | WEIGHT: 195.1 LBS | BODY MASS INDEX: 36.86 KG/M2 | HEART RATE: 72 BPM | SYSTOLIC BLOOD PRESSURE: 116 MMHG

## 2023-10-05 DIAGNOSIS — E66.01 CLASS 2 SEVERE OBESITY DUE TO EXCESS CALORIES WITH SERIOUS COMORBIDITY AND BODY MASS INDEX (BMI) OF 36.0 TO 36.9 IN ADULT (MULTI): ICD-10-CM

## 2023-10-05 DIAGNOSIS — Z23 NEED FOR INFLUENZA VACCINATION: ICD-10-CM

## 2023-10-05 DIAGNOSIS — R73.9 HYPERGLYCEMIA: Primary | ICD-10-CM

## 2023-10-05 DIAGNOSIS — M51.36 DEGENERATION OF LUMBAR INTERVERTEBRAL DISC: ICD-10-CM

## 2023-10-05 DIAGNOSIS — E78.2 MIXED HYPERLIPIDEMIA: ICD-10-CM

## 2023-10-05 DIAGNOSIS — F90.0 ATTENTION DEFICIT HYPERACTIVITY DISORDER (ADHD), PREDOMINANTLY INATTENTIVE TYPE: ICD-10-CM

## 2023-10-05 DIAGNOSIS — K21.9 GASTROESOPHAGEAL REFLUX DISEASE WITHOUT ESOPHAGITIS: ICD-10-CM

## 2023-10-05 DIAGNOSIS — F41.8 DEPRESSION WITH ANXIETY: ICD-10-CM

## 2023-10-05 PROBLEM — E66.812 CLASS 2 SEVERE OBESITY DUE TO EXCESS CALORIES WITH SERIOUS COMORBIDITY AND BODY MASS INDEX (BMI) OF 36.0 TO 36.9 IN ADULT: Status: ACTIVE | Noted: 2023-10-05

## 2023-10-05 PROBLEM — R21 SKIN RASH: Status: RESOLVED | Noted: 2023-03-18 | Resolved: 2023-10-05

## 2023-10-05 PROCEDURE — 1170F FXNL STATUS ASSESSED: CPT | Performed by: INTERNAL MEDICINE

## 2023-10-05 PROCEDURE — 1157F ADVNC CARE PLAN IN RCRD: CPT | Performed by: INTERNAL MEDICINE

## 2023-10-05 PROCEDURE — 99214 OFFICE O/P EST MOD 30 MIN: CPT | Performed by: INTERNAL MEDICINE

## 2023-10-05 PROCEDURE — G0008 ADMIN INFLUENZA VIRUS VAC: HCPCS | Performed by: INTERNAL MEDICINE

## 2023-10-05 PROCEDURE — 3008F BODY MASS INDEX DOCD: CPT | Performed by: INTERNAL MEDICINE

## 2023-10-05 PROCEDURE — 1159F MED LIST DOCD IN RCRD: CPT | Performed by: INTERNAL MEDICINE

## 2023-10-05 PROCEDURE — 1036F TOBACCO NON-USER: CPT | Performed by: INTERNAL MEDICINE

## 2023-10-05 PROCEDURE — 90662 IIV NO PRSV INCREASED AG IM: CPT | Performed by: INTERNAL MEDICINE

## 2023-10-05 PROCEDURE — 1160F RVW MEDS BY RX/DR IN RCRD: CPT | Performed by: INTERNAL MEDICINE

## 2023-10-05 RX ORDER — DEXTROAMPHETAMINE SACCHARATE, AMPHETAMINE ASPARTATE, DEXTROAMPHETAMINE SULFATE AND AMPHETAMINE SULFATE 7.5; 7.5; 7.5; 7.5 MG/1; MG/1; MG/1; MG/1
30 TABLET ORAL 2 TIMES DAILY
Qty: 60 TABLET | Refills: 0 | Status: SHIPPED | OUTPATIENT
Start: 2023-10-05 | End: 2023-11-06 | Stop reason: SDUPTHER

## 2023-10-05 RX ORDER — CYCLOBENZAPRINE HCL 10 MG
10 TABLET ORAL NIGHTLY
Qty: 90 TABLET | Refills: 1 | Status: SHIPPED | OUTPATIENT
Start: 2023-10-05 | End: 2024-02-09 | Stop reason: SDUPTHER

## 2023-10-05 ASSESSMENT — ENCOUNTER SYMPTOMS
SEIZURES: 0
ANAL BLEEDING: 0
TROUBLE SWALLOWING: 0
FEVER: 0
DIAPHORESIS: 0
JOINT SWELLING: 0
SORE THROAT: 0
ABDOMINAL DISTENTION: 0
DEPRESSION: 0
COUGH: 0
MYALGIAS: 0
COLOR CHANGE: 0
RECTAL PAIN: 0
APNEA: 0
BACK PAIN: 1
ABDOMINAL PAIN: 0
WOUND: 0
POLYPHAGIA: 0
NERVOUS/ANXIOUS: 1
CHILLS: 0
DIARRHEA: 0
ARTHRALGIAS: 0
EYE REDNESS: 0
SPEECH DIFFICULTY: 0
FLANK PAIN: 0
WHEEZING: 0
SINUS PRESSURE: 0
AGITATION: 0
SLEEP DISTURBANCE: 0
DYSURIA: 0
LOSS OF SENSATION IN FEET: 0
POLYDIPSIA: 0
VOMITING: 0
ACTIVITY CHANGE: 0
BRUISES/BLEEDS EASILY: 0
APPETITE CHANGE: 0
RHINORRHEA: 0
STRIDOR: 0
BLOOD IN STOOL: 0
NECK PAIN: 0
CHEST TIGHTNESS: 0
NUMBNESS: 0
EYE DISCHARGE: 0
UNEXPECTED WEIGHT CHANGE: 0
NAUSEA: 0
HYPERACTIVE: 0
EYE ITCHING: 0
OCCASIONAL FEELINGS OF UNSTEADINESS: 0
NECK STIFFNESS: 0
CONSTIPATION: 0
WEAKNESS: 0
EYE PAIN: 0
SINUS PAIN: 0
FACIAL SWELLING: 0
PHOTOPHOBIA: 0
HEADACHES: 0
HEMATURIA: 0
ADENOPATHY: 0
DIZZINESS: 0
FREQUENCY: 0
CHOKING: 0
HALLUCINATIONS: 0
DECREASED CONCENTRATION: 0
LIGHT-HEADEDNESS: 0
SHORTNESS OF BREATH: 0
DIFFICULTY URINATING: 0
FATIGUE: 0
PALPITATIONS: 0
TREMORS: 0
FACIAL ASYMMETRY: 0
CONFUSION: 0
VOICE CHANGE: 0
DYSPHORIC MOOD: 1

## 2023-10-05 ASSESSMENT — PATIENT HEALTH QUESTIONNAIRE - PHQ9
SUM OF ALL RESPONSES TO PHQ9 QUESTIONS 1 AND 2: 0
1. LITTLE INTEREST OR PLEASURE IN DOING THINGS: NOT AT ALL
2. FEELING DOWN, DEPRESSED OR HOPELESS: NOT AT ALL

## 2023-10-05 ASSESSMENT — ACTIVITIES OF DAILY LIVING (ADL)
DOING_HOUSEWORK: INDEPENDENT
TAKING_MEDICATION: INDEPENDENT
BATHING: INDEPENDENT
GROCERY_SHOPPING: INDEPENDENT
MANAGING_FINANCES: INDEPENDENT
DRESSING: INDEPENDENT

## 2023-10-05 NOTE — PROGRESS NOTES
Subjective   Patient ID: Thea Kay is a 68 y.o. female who presents for Medication Question (Patient here to discuss Ozempic).    HPI    Pt here in follow up.  She tells me she had labs through [facility] and [facility] that showed slightly elevated sugar (per patient 101).  She was worried about this.      She is up in weight a bit since May visit (5 lbs).  She wants to talk about weight loss and is inquiring about Ozempic.  She craves sugar and carbs.  She cannot do much on exercise front due to back pain (low back).   She will occasionally emotionally eat but more times she just wants the sugar rush.  She admits she eats a lot of sugar.  She has done WW several times and did have some success with it.      She had mammogram in June that was initially abnormal.  She ended up having biopsy to the right breast that was benign-fibroadipose tissue with benign epithelium and stromal fibrosis.      She sees Dr. To for GYN care and had a visit this year.      She had dexa in June 2023 that showed osteopenia.  She is on vitamin D3 but not calcium.  She does take Tums as needed.      She had colonoscopy in 2020 by Dr. Jay.  Did have multiple polyps.  Repeat in 3-5 years.  No GI issues.      She is interested in the flu shot.  She had original shingles vaccine but not shingrix.      She is already on Wellbutrin 450 mg due to mood issues.  She feels this helps.  She has lost several close friends recently.      She had EGD with biopsies late 8/2023.  No H.Pylori was noted just chronic inflammation.      Review of Systems   Constitutional:  Negative for activity change, appetite change, chills, diaphoresis, fatigue, fever and unexpected weight change.   HENT:  Positive for hearing loss. Negative for congestion, dental problem, drooling, ear discharge, ear pain, facial swelling, mouth sores, nosebleeds, postnasal drip, rhinorrhea, sinus pressure, sinus pain, sneezing, sore throat, tinnitus, trouble swallowing and voice  change.    Eyes:  Positive for visual disturbance (wears glasses-has up coming appointment in 12/2023). Negative for photophobia, pain, discharge, redness and itching.   Respiratory:  Negative for apnea, cough, choking, chest tightness, shortness of breath, wheezing and stridor.    Cardiovascular:  Negative for chest pain, palpitations and leg swelling.   Gastrointestinal:  Negative for abdominal distention, abdominal pain, anal bleeding, blood in stool, constipation, diarrhea, nausea, rectal pain and vomiting.   Endocrine: Negative for cold intolerance, heat intolerance, polydipsia, polyphagia and polyuria.   Genitourinary:  Negative for decreased urine volume, difficulty urinating, dyspareunia, dysuria, enuresis, flank pain, frequency, genital sores, hematuria, menstrual problem, pelvic pain, urgency, vaginal bleeding, vaginal discharge and vaginal pain.   Musculoskeletal:  Positive for back pain. Negative for arthralgias, gait problem, joint swelling, myalgias, neck pain and neck stiffness.   Skin:  Negative for color change, pallor, rash and wound.   Allergic/Immunologic: Negative for environmental allergies, food allergies and immunocompromised state.   Neurological:  Negative for dizziness, tremors, seizures, syncope, facial asymmetry, speech difficulty, weakness, light-headedness, numbness and headaches.   Hematological:  Negative for adenopathy. Does not bruise/bleed easily.   Psychiatric/Behavioral:  Positive for dysphoric mood. Negative for agitation, behavioral problems, confusion, decreased concentration, hallucinations, self-injury, sleep disturbance and suicidal ideas. The patient is nervous/anxious. The patient is not hyperactive.        Objective   /70   Pulse 72   Wt 88.5 kg (195 lb 1.6 oz)   BMI 36.86 kg/m²    Physical Exam  Constitutional:       Appearance: Normal appearance. She is obese.   Cardiovascular:      Rate and Rhythm: Normal rate and regular rhythm.      Heart sounds: Normal  heart sounds.   Pulmonary:      Effort: Pulmonary effort is normal.      Breath sounds: Normal breath sounds.   Abdominal:      General: Bowel sounds are normal.   Musculoskeletal:         General: No swelling or tenderness.      Right lower leg: No edema.      Left lower leg: No edema.   Lymphadenopathy:      Cervical: No cervical adenopathy.   Neurological:      Mental Status: She is alert and oriented to person, place, and time.   Psychiatric:         Mood and Affect: Mood normal.         Assessment/Plan   Problem List Items Addressed This Visit       Attention deficit hyperactivity disorder (ADHD), predominantly inattentive type     OARRS reviewed  CSA and urine already done for this year  Stable on current regimen  Refill provided         Relevant Medications    amphetamine-dextroamphetamine (Adderall) 30 mg tablet    Other Relevant Orders    Follow Up In Primary Care - Established    Depression with anxiety     Stable on Wellbutrin 450 mg/day dose  Has some grief with recent loss of close family friends          Relevant Orders    Follow Up In Primary Care - Established    GERD (gastroesophageal reflux disease)     Just had egd  Working with [facility] due to hiatal hernia  On PPI therapy          Relevant Orders    Follow Up In Primary Care - Established    Mixed hyperlipidemia     On statin therapy          Relevant Orders    Follow Up In Primary Care - Established    Degeneration of lumbar intervertebral disc     Pt using cyclobenzaprine  Did have injection recently that did not help  Has daily pain that limits her mobility          Relevant Medications    cyclobenzaprine (Flexeril) 10 mg tablet    Other Relevant Orders    Follow Up In Primary Care - Established    Class 2 severe obesity due to excess calories with serious comorbidity and body mass index (BMI) of 36.0 to 36.9 in adult (CMS/[facility])     Discuss wegovy-she will look into coverage  She will have A1c testing done to see if candidate for ozempic-if  diabetic          Relevant Orders    Follow Up In Primary Care - Established     Other Visit Diagnoses       Hyperglycemia    -  Primary    Relevant Orders    Hemoglobin A1C    Follow Up In Primary Care - Established    Need for influenza vaccination        Relevant Orders    Flu vaccine, quadrivalent, high-dose, preservative free, age 65y+ (FLUZONE) (Completed)

## 2023-10-05 NOTE — ASSESSMENT & PLAN NOTE
OARRS reviewed  CSA and urine already done for this year  Stable on current regimen  Refill provided

## 2023-10-05 NOTE — ASSESSMENT & PLAN NOTE
Discuss wegovy-she will look into coverage  She will have A1c testing done to see if candidate for ozempic-if diabetic

## 2023-10-05 NOTE — ASSESSMENT & PLAN NOTE
Pt using cyclobenzaprine  Did have injection recently that did not help  Has daily pain that limits her mobility

## 2023-10-05 NOTE — PATIENT INSTRUCTIONS
Look into Wegovy coverage and let us know if covered  For now would get back into WW to help manage weight   Look for low sugar/sweet alternatives to help manage the cravings you have   Get A1c testing done when able to fast (no food after midnight) can have water/black coffee  Start taking Calcium 1200 mg/day and continue D3 2000 units at least/day for bone health  Bring copy of DPOA and living will regarding healthcare wishes for our records  You got your flu shot today; do recommend the covid booster  If you have not had the 2 part Shingrix vaccine (get this at local pharmacy)  We did refill cyclobenzaprine and Adderall   Follow up here in 6 months-sooner if needed

## 2023-10-19 DIAGNOSIS — E78.2 MIXED HYPERLIPIDEMIA: Primary | ICD-10-CM

## 2023-10-20 RX ORDER — ATORVASTATIN CALCIUM 40 MG/1
40 TABLET, FILM COATED ORAL DAILY
Qty: 90 TABLET | Refills: 3 | Status: SHIPPED | OUTPATIENT
Start: 2023-10-20

## 2023-11-06 DIAGNOSIS — F41.8 DEPRESSION WITH ANXIETY: ICD-10-CM

## 2023-11-06 DIAGNOSIS — F90.0 ATTENTION DEFICIT HYPERACTIVITY DISORDER (ADHD), PREDOMINANTLY INATTENTIVE TYPE: ICD-10-CM

## 2023-11-06 RX ORDER — DEXTROAMPHETAMINE SACCHARATE, AMPHETAMINE ASPARTATE, DEXTROAMPHETAMINE SULFATE AND AMPHETAMINE SULFATE 7.5; 7.5; 7.5; 7.5 MG/1; MG/1; MG/1; MG/1
30 TABLET ORAL 2 TIMES DAILY
Qty: 60 TABLET | Refills: 0 | Status: SHIPPED | OUTPATIENT
Start: 2023-11-06 | End: 2023-11-16 | Stop reason: SDUPTHER

## 2023-11-06 RX ORDER — BUPROPION HYDROCHLORIDE 300 MG/1
300 TABLET ORAL DAILY
Qty: 90 TABLET | Refills: 3 | Status: SHIPPED | OUTPATIENT
Start: 2023-11-06 | End: 2024-02-08 | Stop reason: SDUPTHER

## 2023-11-06 RX ORDER — BUPROPION HYDROCHLORIDE 150 MG/1
150 TABLET ORAL DAILY
Qty: 90 TABLET | Refills: 3 | Status: SHIPPED | OUTPATIENT
Start: 2023-11-06 | End: 2024-05-02 | Stop reason: SDUPTHER

## 2023-11-16 ENCOUNTER — OFFICE VISIT (OUTPATIENT)
Dept: PRIMARY CARE | Facility: CLINIC | Age: 68
End: 2023-11-16
Payer: MEDICARE

## 2023-11-16 VITALS
HEIGHT: 63 IN | BODY MASS INDEX: 34.45 KG/M2 | WEIGHT: 194.4 LBS | DIASTOLIC BLOOD PRESSURE: 64 MMHG | HEART RATE: 80 BPM | SYSTOLIC BLOOD PRESSURE: 118 MMHG

## 2023-11-16 DIAGNOSIS — K21.9 GASTROESOPHAGEAL REFLUX DISEASE WITHOUT ESOPHAGITIS: ICD-10-CM

## 2023-11-16 DIAGNOSIS — E78.2 MIXED HYPERLIPIDEMIA: ICD-10-CM

## 2023-11-16 DIAGNOSIS — R49.0 HOARSENESS OF VOICE: ICD-10-CM

## 2023-11-16 DIAGNOSIS — F90.0 ATTENTION DEFICIT HYPERACTIVITY DISORDER (ADHD), PREDOMINANTLY INATTENTIVE TYPE: ICD-10-CM

## 2023-11-16 DIAGNOSIS — F41.8 DEPRESSION WITH ANXIETY: ICD-10-CM

## 2023-11-16 DIAGNOSIS — E66.09 CLASS 1 OBESITY DUE TO EXCESS CALORIES WITH SERIOUS COMORBIDITY AND BODY MASS INDEX (BMI) OF 34.0 TO 34.9 IN ADULT: Primary | ICD-10-CM

## 2023-11-16 PROBLEM — E66.811 CLASS 1 OBESITY DUE TO EXCESS CALORIES WITH SERIOUS COMORBIDITY AND BODY MASS INDEX (BMI) OF 34.0 TO 34.9 IN ADULT: Status: ACTIVE | Noted: 2023-11-16

## 2023-11-16 PROCEDURE — 1160F RVW MEDS BY RX/DR IN RCRD: CPT | Performed by: INTERNAL MEDICINE

## 2023-11-16 PROCEDURE — 1159F MED LIST DOCD IN RCRD: CPT | Performed by: INTERNAL MEDICINE

## 2023-11-16 PROCEDURE — 1036F TOBACCO NON-USER: CPT | Performed by: INTERNAL MEDICINE

## 2023-11-16 PROCEDURE — 99214 OFFICE O/P EST MOD 30 MIN: CPT | Performed by: INTERNAL MEDICINE

## 2023-11-16 PROCEDURE — 3008F BODY MASS INDEX DOCD: CPT | Performed by: INTERNAL MEDICINE

## 2023-11-16 RX ORDER — TOPIRAMATE 25 MG/1
TABLET ORAL
Qty: 120 TABLET | Refills: 2 | Status: SHIPPED | OUTPATIENT
Start: 2023-11-16 | End: 2023-12-12 | Stop reason: ALTCHOICE

## 2023-11-16 RX ORDER — DEXTROAMPHETAMINE SACCHARATE, AMPHETAMINE ASPARTATE, DEXTROAMPHETAMINE SULFATE AND AMPHETAMINE SULFATE 7.5; 7.5; 7.5; 7.5 MG/1; MG/1; MG/1; MG/1
30 TABLET ORAL 2 TIMES DAILY
Qty: 60 TABLET | Refills: 0 | Status: SHIPPED | OUTPATIENT
Start: 2023-11-16 | End: 2023-12-07 | Stop reason: SDUPTHER

## 2023-11-16 ASSESSMENT — ENCOUNTER SYMPTOMS
CONSTIPATION: 0
DECREASED CONCENTRATION: 0
COUGH: 0
SHORTNESS OF BREATH: 0
CHILLS: 0
CONFUSION: 0
APPETITE CHANGE: 0
DYSPHORIC MOOD: 0
VOMITING: 0
LIGHT-HEADEDNESS: 0
PALPITATIONS: 0
SLEEP DISTURBANCE: 0
FATIGUE: 0
UNEXPECTED WEIGHT CHANGE: 0
DIARRHEA: 0
WHEEZING: 0
CHEST TIGHTNESS: 0
ACTIVITY CHANGE: 0
BACK PAIN: 1
FEVER: 0
HEADACHES: 0
NERVOUS/ANXIOUS: 0
NAUSEA: 0
DIZZINESS: 0
ABDOMINAL PAIN: 0

## 2023-11-16 NOTE — ASSESSMENT & PLAN NOTE
Unable to do contrave as patient on higher dose wellbutrin  She is on stimulate for ADHD so cannot do adipex/qysmia  Will try topiramate to see if this helps with appetite suppression/cravings  She looked into Wegovy but coverage is issue

## 2023-11-16 NOTE — PATIENT INSTRUCTIONS
Do recommend the 2 part Shingrix vaccine (have this done at local pharmacy)   Start Topiramate 25 mg by taking 1 tablet daily at night for 7 days then increase to 1 pill twice a day for 7 days, then 2 in AM and 1 in PM, and 2 tablets twice a day  This is to help lessen appetite and cravings   Continue Adderall as directed-I sent in refill ok to fill on 12/6/2023  Continue healthy diet-lean meats/less carbs/sugars and exercise as able  See pain management next week  Follow up in 3 months for weight check/new medication check

## 2023-11-16 NOTE — ASSESSMENT & PLAN NOTE
On Adderall with good control of symptoms  OARRS reviewed and appropriate  New rx given to fill in 12/2023 when due

## 2023-11-16 NOTE — PROGRESS NOTES
"Subjective   Patient ID: Thea Kay is a 68 y.o. female who presents for Follow-up (6m).    HPI  Pt here in 6 month follow up.  She is up in weight a bit since the spring.      She is in pain management for her chronic neck/back pain.  She was suppose to have another back injection but per patient her appointment had to be moved back due to insurance coverage issues.  She now has an appointment     She is on Adderall twice a day.  She last filled on 11/7/2023.  She has no issues with use-it helps with her concentration/focus.  She is able to get her task done.      She had mammogram in June of 2023 that was abnormal on right.  She ended up having diagnostic and biopsy of breast which was normal.  They noted to have ultrasound of breast in 6 months-12/23.      She was noted on EGD to have mild hiatal hernia.  She does have GERD and takes Omeprazole.  She notes hoarseness of voice.      She had dexa in June through GYN and did have osteopenia.  She is on calcium and Vitamin D.      Review of Systems   Constitutional:  Negative for activity change, appetite change, chills, fatigue, fever and unexpected weight change.   Respiratory:  Negative for cough, chest tightness, shortness of breath and wheezing.    Cardiovascular:  Negative for chest pain, palpitations and leg swelling.   Gastrointestinal:  Negative for abdominal pain, constipation, diarrhea, nausea and vomiting.   Musculoskeletal:  Positive for back pain.   Neurological:  Negative for dizziness, light-headedness and headaches.   Psychiatric/Behavioral:  Negative for confusion, decreased concentration, dysphoric mood and sleep disturbance. The patient is not nervous/anxious.        Objective   /64 (BP Location: Left arm, Patient Position: Sitting)   Pulse 80   Ht 1.6 m (5' 3\")   Wt 88.2 kg (194 lb 6.4 oz)   BMI 34.44 kg/m²    Physical Exam  Constitutional:       Appearance: Normal appearance. She is obese.   Cardiovascular:      Rate and Rhythm: " Normal rate and regular rhythm.      Heart sounds: Normal heart sounds.   Pulmonary:      Effort: Pulmonary effort is normal.      Breath sounds: Normal breath sounds.   Abdominal:      General: Bowel sounds are normal.   Musculoskeletal:      Right lower leg: No edema.      Left lower leg: No edema.   Neurological:      Mental Status: She is alert and oriented to person, place, and time.   Psychiatric:         Mood and Affect: Mood normal.         Assessment/Plan   Problem List Items Addressed This Visit       Attention deficit hyperactivity disorder (ADHD), predominantly inattentive type     On Adderall with good control of symptoms  OARRS reviewed and appropriate  New rx given to fill in 12/2023 when due          Relevant Medications    amphetamine-dextroamphetamine (Adderall) 30 mg tablet    Depression with anxiety    GERD (gastroesophageal reflux disease)     Manages with PPI and tums          Mixed hyperlipidemia    Hoarseness of voice    Class 1 obesity due to excess calories with serious comorbidity and body mass index (BMI) of 34.0 to 34.9 in adult - Primary     Unable to do contrave as patient on higher dose wellbutrin  She is on stimulate for ADHD so cannot do adipex/qysmia  Will try topiramate to see if this helps with appetite suppression/cravings  She looked into [employer] but coverage is issue          Relevant Medications    topiramate (Topamax) 25 mg tablet    Other Relevant Orders    Follow Up In Primary Care - Established

## 2023-11-30 ENCOUNTER — TELEPHONE (OUTPATIENT)
Dept: PRIMARY CARE | Facility: CLINIC | Age: 68
End: 2023-11-30
Payer: MEDICARE

## 2023-11-30 DIAGNOSIS — B37.0 THRUSH OF MOUTH AND ESOPHAGUS (MULTI): Primary | ICD-10-CM

## 2023-11-30 DIAGNOSIS — B37.81 THRUSH OF MOUTH AND ESOPHAGUS (MULTI): Primary | ICD-10-CM

## 2023-11-30 RX ORDER — NYSTATIN 100000 [USP'U]/ML
5 SUSPENSION ORAL 4 TIMES DAILY
Qty: 280 ML | Refills: 0 | Status: SHIPPED | OUTPATIENT
Start: 2023-11-30 | End: 2023-12-14

## 2023-11-30 NOTE — TELEPHONE ENCOUNTER
Patient still has discomfort on the inside of her cheeks/tongue it has gotten better she is doing salt water gargles, and her dentist looked at it and didn't see anything. She thinks its starting to bother her throat, she has some discomfort when swallowing, it burns. She does take Omeprazole daily.       Please advise

## 2023-11-30 NOTE — TELEPHONE ENCOUNTER
May be thrush; I will send in nystatin swish and swallow; she is to do 5 ml 4 times a day with 2 times her swish/gargle and spit out the solution, the other 2 times she should swish/gargle and swallow.  Do this for up to 2 weeks but if after 5 days she is not noting improvements she can stop use and let me know

## 2023-12-07 DIAGNOSIS — F90.0 ATTENTION DEFICIT HYPERACTIVITY DISORDER (ADHD), PREDOMINANTLY INATTENTIVE TYPE: ICD-10-CM

## 2023-12-07 RX ORDER — DEXTROAMPHETAMINE SACCHARATE, AMPHETAMINE ASPARTATE, DEXTROAMPHETAMINE SULFATE AND AMPHETAMINE SULFATE 7.5; 7.5; 7.5; 7.5 MG/1; MG/1; MG/1; MG/1
30 TABLET ORAL 2 TIMES DAILY
Qty: 60 TABLET | Refills: 0 | Status: SHIPPED | OUTPATIENT
Start: 2023-12-07 | End: 2024-01-19 | Stop reason: SDUPTHER

## 2023-12-12 ENCOUNTER — TELEPHONE (OUTPATIENT)
Dept: PRIMARY CARE | Facility: CLINIC | Age: 68
End: 2023-12-12
Payer: MEDICARE

## 2023-12-12 DIAGNOSIS — E66.09 CLASS 1 OBESITY DUE TO EXCESS CALORIES WITH SERIOUS COMORBIDITY AND BODY MASS INDEX (BMI) OF 34.0 TO 34.9 IN ADULT: Primary | ICD-10-CM

## 2023-12-12 RX ORDER — TOPIRAMATE 50 MG/1
50 TABLET, FILM COATED ORAL 2 TIMES DAILY
Qty: 180 TABLET | Refills: 1 | Status: SHIPPED | OUTPATIENT
Start: 2023-12-12 | End: 2024-02-19 | Stop reason: SINTOL

## 2023-12-12 NOTE — TELEPHONE ENCOUNTER
She needs a rx sent to [facility] for Topamax, she has a refill but with those directions, pharmacist told Thea it would need a PA since the second refill. So she needs new rx  sent with directions 2 pills twice a day.

## 2024-01-15 RX ORDER — TRIAMCINOLONE ACETONIDE 1 MG/G
CREAM TOPICAL
COMMUNITY
Start: 2023-11-17

## 2024-01-19 DIAGNOSIS — F90.0 ATTENTION DEFICIT HYPERACTIVITY DISORDER (ADHD), PREDOMINANTLY INATTENTIVE TYPE: ICD-10-CM

## 2024-01-19 RX ORDER — DEXTROAMPHETAMINE SACCHARATE, AMPHETAMINE ASPARTATE, DEXTROAMPHETAMINE SULFATE AND AMPHETAMINE SULFATE 7.5; 7.5; 7.5; 7.5 MG/1; MG/1; MG/1; MG/1
30 TABLET ORAL 2 TIMES DAILY
Qty: 60 TABLET | Refills: 0 | Status: SHIPPED | OUTPATIENT
Start: 2024-01-19 | End: 2024-02-19 | Stop reason: SDUPTHER

## 2024-02-08 DIAGNOSIS — F41.8 DEPRESSION WITH ANXIETY: ICD-10-CM

## 2024-02-08 DIAGNOSIS — M51.36 DEGENERATION OF LUMBAR INTERVERTEBRAL DISC: ICD-10-CM

## 2024-02-08 NOTE — TELEPHONE ENCOUNTER
She is requesting refill on cyclobenzaprine 10 mg. She said she seems to be needing to use it more lately. She would like to know if she can have the prescription increased to 1 tablet twice per day or three times per day. She needs refill as well.

## 2024-02-09 RX ORDER — CYCLOBENZAPRINE HCL 10 MG
10 TABLET ORAL 2 TIMES DAILY PRN
Qty: 180 TABLET | Refills: 1 | Status: SHIPPED | OUTPATIENT
Start: 2024-02-09

## 2024-02-09 RX ORDER — BUPROPION HYDROCHLORIDE 300 MG/1
300 TABLET ORAL DAILY
Qty: 90 TABLET | Refills: 3 | Status: SHIPPED | OUTPATIENT
Start: 2024-02-09 | End: 2024-05-02 | Stop reason: SDUPTHER

## 2024-02-10 DIAGNOSIS — G89.29 CHRONIC NECK PAIN: ICD-10-CM

## 2024-02-10 DIAGNOSIS — M54.2 CHRONIC NECK PAIN: ICD-10-CM

## 2024-02-10 DIAGNOSIS — K21.9 GASTROESOPHAGEAL REFLUX DISEASE, UNSPECIFIED WHETHER ESOPHAGITIS PRESENT: ICD-10-CM

## 2024-02-12 RX ORDER — MELOXICAM 15 MG/1
TABLET ORAL
Qty: 90 TABLET | Refills: 3 | Status: SHIPPED | OUTPATIENT
Start: 2024-02-12

## 2024-02-12 RX ORDER — OMEPRAZOLE 20 MG/1
CAPSULE, DELAYED RELEASE ORAL
Qty: 90 CAPSULE | Refills: 3 | Status: SHIPPED | OUTPATIENT
Start: 2024-02-12

## 2024-02-19 ENCOUNTER — OFFICE VISIT (OUTPATIENT)
Dept: PRIMARY CARE | Facility: CLINIC | Age: 69
End: 2024-02-19
Payer: MEDICARE

## 2024-02-19 VITALS
SYSTOLIC BLOOD PRESSURE: 118 MMHG | WEIGHT: 191 LBS | HEART RATE: 96 BPM | DIASTOLIC BLOOD PRESSURE: 58 MMHG | BODY MASS INDEX: 33.83 KG/M2

## 2024-02-19 DIAGNOSIS — F43.9 STRESS AT HOME: Primary | ICD-10-CM

## 2024-02-19 DIAGNOSIS — F90.0 ATTENTION DEFICIT HYPERACTIVITY DISORDER (ADHD), PREDOMINANTLY INATTENTIVE TYPE: ICD-10-CM

## 2024-02-19 DIAGNOSIS — E66.09 CLASS 1 OBESITY DUE TO EXCESS CALORIES WITH SERIOUS COMORBIDITY AND BODY MASS INDEX (BMI) OF 34.0 TO 34.9 IN ADULT: ICD-10-CM

## 2024-02-19 PROCEDURE — 99213 OFFICE O/P EST LOW 20 MIN: CPT | Performed by: INTERNAL MEDICINE

## 2024-02-19 PROCEDURE — 1036F TOBACCO NON-USER: CPT | Performed by: INTERNAL MEDICINE

## 2024-02-19 PROCEDURE — 3008F BODY MASS INDEX DOCD: CPT | Performed by: INTERNAL MEDICINE

## 2024-02-19 PROCEDURE — 1157F ADVNC CARE PLAN IN RCRD: CPT | Performed by: INTERNAL MEDICINE

## 2024-02-19 RX ORDER — DEXTROAMPHETAMINE SACCHARATE, AMPHETAMINE ASPARTATE, DEXTROAMPHETAMINE SULFATE AND AMPHETAMINE SULFATE 7.5; 7.5; 7.5; 7.5 MG/1; MG/1; MG/1; MG/1
30 TABLET ORAL 2 TIMES DAILY
Qty: 60 TABLET | Refills: 0 | Status: SHIPPED | OUTPATIENT
Start: 2024-02-19 | End: 2024-02-29 | Stop reason: SDUPTHER

## 2024-02-19 ASSESSMENT — ENCOUNTER SYMPTOMS
APPETITE CHANGE: 1
ACTIVITY CHANGE: 1
NERVOUS/ANXIOUS: 1

## 2024-02-19 NOTE — PATIENT INSTRUCTIONS
Set alarm on phone to remind yourself you need to eat (do this anywhere between 4-7 pm)  Make better food choices-lean proteins/fish/veggies, low carb/low sugar foods are ideal  Exercise-even if just a walk most days for 30 minutes each time  We can consider Zepbound if able to afford-this is one injection a week and we dose increase each month as able (see about coverage)  Wean off Topamax by taking 1 pill daily for 5-7 days, 1/2 tablet daily for 5-7 days then stop use due to side effects and little weight loss noted  Continue all other meds as directed  Follow up here in 6 months for full physical (come fasting for blood work and urine evaluation-can have water/black coffee/tea in AM)

## 2024-02-19 NOTE — ASSESSMENT & PLAN NOTE
She is not able to concentrate right now on weight loss for herself secondary to her  who is ill  Topamax just gave her side effects with little help in weight loss so will wean off this and stop use  She is on WW now and I advised her to set alarm on clock to help her to eat earlier in day and pick better food choices  Encouraged her to try to walk most days a week for at least 30 minutes/each time

## 2024-02-19 NOTE — PROGRESS NOTES
Subjective   Patient ID: Thea Kay is a 68 y.o. female who presents for Weight Check.    HPI    Pt here for weight check.  We started Topamax after our visit in 11/2023.  She is down just a few pounds.  She tells me she can go long periods of time without eating and then when she recalls need to eat it is late and then she picks bad foods.  The Topamax has made her very thirsty.  She admits to making poor food choices and she is not moving much.  She is back on WW right now to help with diet.      She had labs in 10/2023 that showed she is not diabetic-A1c 5.1% at that time.      She was in the [facility] weight loss program but stopped it.      She is very stressed with her  who has a history of a heart transplant in years past (12 yo) but recently noted to have failed transplant.  He is 76 yo.      Review of Systems   Constitutional:  Positive for activity change and appetite change.   HENT:          Dry mouth   Very thirsty    Psychiatric/Behavioral:  The patient is nervous/anxious.         +stress at home        Objective   /58 (BP Location: Left arm, Patient Position: Sitting)   Pulse 96   Wt 86.6 kg (191 lb)   BMI 33.83 kg/m²    Physical Exam  Constitutional:       Appearance: Normal appearance. She is obese.   Cardiovascular:      Rate and Rhythm: Normal rate and regular rhythm.      Heart sounds: Normal heart sounds.   Pulmonary:      Effort: Pulmonary effort is normal.      Breath sounds: Normal breath sounds.   Abdominal:      General: Bowel sounds are normal.      Palpations: Abdomen is soft.   Musculoskeletal:      Right lower leg: No edema.      Left lower leg: No edema.   Lymphadenopathy:      Cervical: No cervical adenopathy.   Neurological:      Mental Status: She is alert and oriented to person, place, and time.   Psychiatric:         Mood and Affect: Mood normal.         Assessment/Plan   Problem List Items Addressed This Visit       Class 1 obesity due to excess calories with serious  comorbidity and body mass index (BMI) of 34.0 to 34.9 in adult     She is not able to concentrate right now on weight loss for herself secondary to her  who is ill  Topamax just gave her side effects with little help in weight loss so will wean off this and stop use  She is on WW now and I advised her to set alarm on clock to help her to eat earlier in day and pick better food choices  Encouraged her to try to walk most days a week for at least 30 minutes/each time          Other Visit Diagnoses       Stress at home    -  Primary

## 2024-02-29 DIAGNOSIS — F90.0 ATTENTION DEFICIT HYPERACTIVITY DISORDER (ADHD), PREDOMINANTLY INATTENTIVE TYPE: ICD-10-CM

## 2024-02-29 RX ORDER — DEXTROAMPHETAMINE SACCHARATE, AMPHETAMINE ASPARTATE, DEXTROAMPHETAMINE SULFATE AND AMPHETAMINE SULFATE 7.5; 7.5; 7.5; 7.5 MG/1; MG/1; MG/1; MG/1
30 TABLET ORAL 2 TIMES DAILY
Qty: 60 TABLET | Refills: 0 | Status: SHIPPED | OUTPATIENT
Start: 2024-02-29 | End: 2024-04-17 | Stop reason: SDUPTHER

## 2024-04-06 DIAGNOSIS — E28.319 PREMATURE MENOPAUSE: ICD-10-CM

## 2024-04-08 RX ORDER — PROGESTERONE 200 MG/1
CAPSULE ORAL
Qty: 90 CAPSULE | Refills: 3 | Status: SHIPPED | OUTPATIENT
Start: 2024-04-08

## 2024-04-17 DIAGNOSIS — F90.0 ATTENTION DEFICIT HYPERACTIVITY DISORDER (ADHD), PREDOMINANTLY INATTENTIVE TYPE: ICD-10-CM

## 2024-04-18 RX ORDER — DEXTROAMPHETAMINE SACCHARATE, AMPHETAMINE ASPARTATE, DEXTROAMPHETAMINE SULFATE AND AMPHETAMINE SULFATE 7.5; 7.5; 7.5; 7.5 MG/1; MG/1; MG/1; MG/1
30 TABLET ORAL 2 TIMES DAILY
Qty: 60 TABLET | Refills: 0 | Status: SHIPPED | OUTPATIENT
Start: 2024-04-18 | End: 2024-05-24 | Stop reason: SDUPTHER

## 2024-05-02 DIAGNOSIS — F41.8 DEPRESSION WITH ANXIETY: ICD-10-CM

## 2024-05-03 RX ORDER — BUPROPION HYDROCHLORIDE 150 MG/1
150 TABLET ORAL DAILY
Qty: 90 TABLET | Refills: 3 | Status: SHIPPED | OUTPATIENT
Start: 2024-05-03

## 2024-05-03 RX ORDER — BUPROPION HYDROCHLORIDE 300 MG/1
300 TABLET ORAL DAILY
Qty: 90 TABLET | Refills: 3 | Status: SHIPPED | OUTPATIENT
Start: 2024-05-03

## 2024-05-14 ENCOUNTER — TELEPHONE (OUTPATIENT)
Dept: PRIMARY CARE | Facility: CLINIC | Age: 69
End: 2024-05-14

## 2024-05-14 ENCOUNTER — APPOINTMENT (OUTPATIENT)
Dept: PRIMARY CARE | Facility: CLINIC | Age: 69
End: 2024-05-14
Payer: MEDICARE

## 2024-05-14 NOTE — TELEPHONE ENCOUNTER
Thea ordered weight loss medication online. Thru a Clinic,  Semaglutide 0.25 mg hesitant on taking it with out speaking with you . What are your thoughts on her starting it?

## 2024-05-14 NOTE — TELEPHONE ENCOUNTER
We cannot be sure the compound is the same as the FDA approved one.  Many people however are doing it this way-so up to her if she wishes to start use

## 2024-05-24 DIAGNOSIS — F90.0 ATTENTION DEFICIT HYPERACTIVITY DISORDER (ADHD), PREDOMINANTLY INATTENTIVE TYPE: ICD-10-CM

## 2024-05-24 RX ORDER — DEXTROAMPHETAMINE SACCHARATE, AMPHETAMINE ASPARTATE, DEXTROAMPHETAMINE SULFATE AND AMPHETAMINE SULFATE 7.5; 7.5; 7.5; 7.5 MG/1; MG/1; MG/1; MG/1
30 TABLET ORAL 2 TIMES DAILY
Qty: 60 TABLET | Refills: 0 | Status: SHIPPED | OUTPATIENT
Start: 2024-05-24

## 2024-06-27 DIAGNOSIS — F90.0 ATTENTION DEFICIT HYPERACTIVITY DISORDER (ADHD), PREDOMINANTLY INATTENTIVE TYPE: ICD-10-CM

## 2024-06-27 RX ORDER — DEXTROAMPHETAMINE SACCHARATE, AMPHETAMINE ASPARTATE, DEXTROAMPHETAMINE SULFATE AND AMPHETAMINE SULFATE 7.5; 7.5; 7.5; 7.5 MG/1; MG/1; MG/1; MG/1
30 TABLET ORAL 2 TIMES DAILY
Qty: 60 TABLET | Refills: 0 | Status: SHIPPED | OUTPATIENT
Start: 2024-06-27

## 2024-07-23 DIAGNOSIS — F90.0 ATTENTION DEFICIT HYPERACTIVITY DISORDER (ADHD), PREDOMINANTLY INATTENTIVE TYPE: ICD-10-CM

## 2024-07-23 RX ORDER — DEXTROAMPHETAMINE SACCHARATE, AMPHETAMINE ASPARTATE, DEXTROAMPHETAMINE SULFATE AND AMPHETAMINE SULFATE 7.5; 7.5; 7.5; 7.5 MG/1; MG/1; MG/1; MG/1
30 TABLET ORAL 2 TIMES DAILY
Qty: 60 TABLET | Refills: 0 | Status: SHIPPED | OUTPATIENT
Start: 2024-07-23

## 2024-07-26 DIAGNOSIS — M51.36 DEGENERATION OF LUMBAR INTERVERTEBRAL DISC: ICD-10-CM

## 2024-07-26 RX ORDER — CYCLOBENZAPRINE HCL 10 MG
10 TABLET ORAL 2 TIMES DAILY PRN
Qty: 180 TABLET | Refills: 1 | Status: SHIPPED | OUTPATIENT
Start: 2024-07-26

## 2024-08-02 ENCOUNTER — TELEPHONE (OUTPATIENT)
Dept: PRIMARY CARE | Facility: CLINIC | Age: 69
End: 2024-08-02
Payer: MEDICARE

## 2024-08-02 DIAGNOSIS — F41.8 SITUATIONAL ANXIETY: Primary | ICD-10-CM

## 2024-08-02 RX ORDER — HYDROXYZINE HYDROCHLORIDE 25 MG/1
25 TABLET, FILM COATED ORAL EVERY 8 HOURS PRN
Qty: 15 TABLET | Refills: 0 | Status: SHIPPED | OUTPATIENT
Start: 2024-08-02

## 2024-08-02 NOTE — TELEPHONE ENCOUNTER
Latasha Ibarra pt,   Patient is flying to [place] August 7th returning 9th to get her brother and fly home to [place] he might have early stages of Alzheimer's, and he will stay with family to get assessed.  She is terrified of flying and is asking for something for plane ride, not too strong though since she will have her brother with her.     Please advise

## 2024-08-22 ENCOUNTER — APPOINTMENT (OUTPATIENT)
Dept: PRIMARY CARE | Facility: CLINIC | Age: 69
End: 2024-08-22
Payer: MEDICARE

## 2024-08-29 DIAGNOSIS — E78.2 MIXED HYPERLIPIDEMIA: ICD-10-CM

## 2024-08-30 RX ORDER — ATORVASTATIN CALCIUM 40 MG/1
40 TABLET, FILM COATED ORAL DAILY
Qty: 90 TABLET | Refills: 0 | Status: SHIPPED | OUTPATIENT
Start: 2024-08-30

## 2024-09-10 ENCOUNTER — LAB (OUTPATIENT)
Dept: LAB | Facility: LAB | Age: 69
End: 2024-09-10
Payer: MEDICARE

## 2024-09-10 ENCOUNTER — APPOINTMENT (OUTPATIENT)
Dept: PRIMARY CARE | Facility: CLINIC | Age: 69
End: 2024-09-10
Payer: MEDICARE

## 2024-09-10 VITALS
OXYGEN SATURATION: 96 % | WEIGHT: 175.9 LBS | HEIGHT: 63 IN | TEMPERATURE: 98.3 F | DIASTOLIC BLOOD PRESSURE: 70 MMHG | SYSTOLIC BLOOD PRESSURE: 102 MMHG | HEART RATE: 81 BPM | BODY MASS INDEX: 31.17 KG/M2 | RESPIRATION RATE: 18 BRPM

## 2024-09-10 DIAGNOSIS — G89.29 CHRONIC BILATERAL LOW BACK PAIN WITH LEFT-SIDED SCIATICA: ICD-10-CM

## 2024-09-10 DIAGNOSIS — E78.2 MIXED HYPERLIPIDEMIA: ICD-10-CM

## 2024-09-10 DIAGNOSIS — M54.42 CHRONIC BILATERAL LOW BACK PAIN WITH LEFT-SIDED SCIATICA: ICD-10-CM

## 2024-09-10 DIAGNOSIS — E66.09 CLASS 1 OBESITY DUE TO EXCESS CALORIES WITH SERIOUS COMORBIDITY AND BODY MASS INDEX (BMI) OF 30.0 TO 30.9 IN ADULT: ICD-10-CM

## 2024-09-10 DIAGNOSIS — K21.9 GASTROESOPHAGEAL REFLUX DISEASE WITHOUT ESOPHAGITIS: ICD-10-CM

## 2024-09-10 DIAGNOSIS — Z12.31 ENCOUNTER FOR SCREENING MAMMOGRAM FOR MALIGNANT NEOPLASM OF BREAST: ICD-10-CM

## 2024-09-10 DIAGNOSIS — M51.36 DEGENERATION OF LUMBAR INTERVERTEBRAL DISC: ICD-10-CM

## 2024-09-10 DIAGNOSIS — Z23 NEED FOR INFLUENZA VACCINATION: ICD-10-CM

## 2024-09-10 DIAGNOSIS — F41.8 DEPRESSION WITH ANXIETY: ICD-10-CM

## 2024-09-10 DIAGNOSIS — R92.2 DENSE BREAST: ICD-10-CM

## 2024-09-10 DIAGNOSIS — R73.9 HYPERGLYCEMIA: ICD-10-CM

## 2024-09-10 DIAGNOSIS — R92.30 DENSE BREAST: ICD-10-CM

## 2024-09-10 DIAGNOSIS — F90.0 ATTENTION DEFICIT HYPERACTIVITY DISORDER (ADHD), PREDOMINANTLY INATTENTIVE TYPE: ICD-10-CM

## 2024-09-10 DIAGNOSIS — R92.8 ABNORMALITY OF RIGHT BREAST ON SCREENING MAMMOGRAM: ICD-10-CM

## 2024-09-10 DIAGNOSIS — Z00.00 MEDICARE ANNUAL WELLNESS VISIT, SUBSEQUENT: Primary | ICD-10-CM

## 2024-09-10 LAB
ALBUMIN SERPL BCP-MCNC: 4.1 G/DL (ref 3.4–5)
ALP SERPL-CCNC: 101 U/L (ref 33–136)
ALT SERPL W P-5'-P-CCNC: 21 U/L (ref 7–45)
ANION GAP SERPL CALC-SCNC: 12 MMOL/L (ref 10–20)
AST SERPL W P-5'-P-CCNC: 18 U/L (ref 9–39)
BILIRUB SERPL-MCNC: 0.6 MG/DL (ref 0–1.2)
BUN SERPL-MCNC: 17 MG/DL (ref 6–23)
CALCIUM SERPL-MCNC: 9.3 MG/DL (ref 8.6–10.6)
CHLORIDE SERPL-SCNC: 108 MMOL/L (ref 98–107)
CHOLEST SERPL-MCNC: 138 MG/DL (ref 0–199)
CHOLESTEROL/HDL RATIO: 2.3
CO2 SERPL-SCNC: 25 MMOL/L (ref 21–32)
CREAT SERPL-MCNC: 0.87 MG/DL (ref 0.5–1.05)
EGFRCR SERPLBLD CKD-EPI 2021: 73 ML/MIN/1.73M*2
ERYTHROCYTE [DISTWIDTH] IN BLOOD BY AUTOMATED COUNT: 11.9 % (ref 11.5–14.5)
EST. AVERAGE GLUCOSE BLD GHB EST-MCNC: 91 MG/DL
GLUCOSE SERPL-MCNC: 71 MG/DL (ref 74–99)
HBA1C MFR BLD: 4.8 %
HCT VFR BLD AUTO: 40.8 % (ref 36–46)
HDLC SERPL-MCNC: 61.2 MG/DL
HGB BLD-MCNC: 13.5 G/DL (ref 12–16)
LDLC SERPL CALC-MCNC: 59 MG/DL
MCH RBC QN AUTO: 29.2 PG (ref 26–34)
MCHC RBC AUTO-ENTMCNC: 33.1 G/DL (ref 32–36)
MCV RBC AUTO: 88 FL (ref 80–100)
NON HDL CHOLESTEROL: 77 MG/DL (ref 0–149)
NRBC BLD-RTO: 0 /100 WBCS (ref 0–0)
PLATELET # BLD AUTO: 358 X10*3/UL (ref 150–450)
POTASSIUM SERPL-SCNC: 3.9 MMOL/L (ref 3.5–5.3)
PROT SERPL-MCNC: 6.3 G/DL (ref 6.4–8.2)
RBC # BLD AUTO: 4.63 X10*6/UL (ref 4–5.2)
SODIUM SERPL-SCNC: 141 MMOL/L (ref 136–145)
TRIGL SERPL-MCNC: 90 MG/DL (ref 0–149)
VLDL: 18 MG/DL (ref 0–40)
WBC # BLD AUTO: 6.6 X10*3/UL (ref 4.4–11.3)

## 2024-09-10 PROCEDURE — G0439 PPPS, SUBSEQ VISIT: HCPCS | Performed by: INTERNAL MEDICINE

## 2024-09-10 PROCEDURE — 1160F RVW MEDS BY RX/DR IN RCRD: CPT | Performed by: INTERNAL MEDICINE

## 2024-09-10 PROCEDURE — 90662 IIV NO PRSV INCREASED AG IM: CPT | Performed by: INTERNAL MEDICINE

## 2024-09-10 PROCEDURE — 99214 OFFICE O/P EST MOD 30 MIN: CPT | Performed by: INTERNAL MEDICINE

## 2024-09-10 PROCEDURE — 80061 LIPID PANEL: CPT

## 2024-09-10 PROCEDURE — G0008 ADMIN INFLUENZA VIRUS VAC: HCPCS | Performed by: INTERNAL MEDICINE

## 2024-09-10 PROCEDURE — 1158F ADVNC CARE PLAN TLK DOCD: CPT | Performed by: INTERNAL MEDICINE

## 2024-09-10 PROCEDURE — 3008F BODY MASS INDEX DOCD: CPT | Performed by: INTERNAL MEDICINE

## 2024-09-10 PROCEDURE — 1170F FXNL STATUS ASSESSED: CPT | Performed by: INTERNAL MEDICINE

## 2024-09-10 PROCEDURE — 83036 HEMOGLOBIN GLYCOSYLATED A1C: CPT

## 2024-09-10 PROCEDURE — 1157F ADVNC CARE PLAN IN RCRD: CPT | Performed by: INTERNAL MEDICINE

## 2024-09-10 PROCEDURE — 36415 COLL VENOUS BLD VENIPUNCTURE: CPT

## 2024-09-10 PROCEDURE — 1159F MED LIST DOCD IN RCRD: CPT | Performed by: INTERNAL MEDICINE

## 2024-09-10 PROCEDURE — 1123F ACP DISCUSS/DSCN MKR DOCD: CPT | Performed by: INTERNAL MEDICINE

## 2024-09-10 PROCEDURE — 85027 COMPLETE CBC AUTOMATED: CPT

## 2024-09-10 PROCEDURE — 1036F TOBACCO NON-USER: CPT | Performed by: INTERNAL MEDICINE

## 2024-09-10 PROCEDURE — 80053 COMPREHEN METABOLIC PANEL: CPT

## 2024-09-10 RX ORDER — DEXTROAMPHETAMINE SACCHARATE, AMPHETAMINE ASPARTATE, DEXTROAMPHETAMINE SULFATE AND AMPHETAMINE SULFATE 7.5; 7.5; 7.5; 7.5 MG/1; MG/1; MG/1; MG/1
30 TABLET ORAL 2 TIMES DAILY
Qty: 60 TABLET | Refills: 0 | Status: SHIPPED | OUTPATIENT
Start: 2024-09-10

## 2024-09-10 ASSESSMENT — ENCOUNTER SYMPTOMS
SORE THROAT: 0
HYPERACTIVE: 0
NAUSEA: 0
RECTAL PAIN: 0
CONFUSION: 0
APPETITE CHANGE: 0
EYE ITCHING: 0
BACK PAIN: 1
HALLUCINATIONS: 0
PHOTOPHOBIA: 0
PALPITATIONS: 0
POLYPHAGIA: 0
MYALGIAS: 0
ABDOMINAL DISTENTION: 0
EYE PAIN: 0
OCCASIONAL FEELINGS OF UNSTEADINESS: 1
FACIAL ASYMMETRY: 0
COUGH: 0
BLOOD IN STOOL: 0
UNEXPECTED WEIGHT CHANGE: 0
WEAKNESS: 0
SINUS PRESSURE: 0
CHILLS: 0
CHOKING: 0
NECK PAIN: 0
AGITATION: 0
FLANK PAIN: 0
NERVOUS/ANXIOUS: 0
VOICE CHANGE: 0
DEPRESSION: 0
NUMBNESS: 1
LOSS OF SENSATION IN FEET: 0
CONSTIPATION: 0
BRUISES/BLEEDS EASILY: 0
WHEEZING: 0
RHINORRHEA: 0
NECK STIFFNESS: 0
TROUBLE SWALLOWING: 0
COLOR CHANGE: 0
ADENOPATHY: 0
EYE REDNESS: 0
FREQUENCY: 0
STRIDOR: 0
SPEECH DIFFICULTY: 0
CHEST TIGHTNESS: 0
DIAPHORESIS: 0
SHORTNESS OF BREATH: 0
DIFFICULTY URINATING: 0
POLYDIPSIA: 0
DIZZINESS: 0
JOINT SWELLING: 0
SEIZURES: 0
DIARRHEA: 1
DYSURIA: 0
ANAL BLEEDING: 0
ABDOMINAL PAIN: 0
LIGHT-HEADEDNESS: 0
FEVER: 0
VOMITING: 0
ACTIVITY CHANGE: 0
TREMORS: 0
EYE DISCHARGE: 0
SINUS PAIN: 0
ARTHRALGIAS: 0
WOUND: 0
FACIAL SWELLING: 0
DECREASED CONCENTRATION: 0
APNEA: 0
HEMATURIA: 0
FATIGUE: 0
HEADACHES: 0
SLEEP DISTURBANCE: 0
DYSPHORIC MOOD: 1

## 2024-09-10 ASSESSMENT — ACTIVITIES OF DAILY LIVING (ADL)
DRESSING: INDEPENDENT
DOING_HOUSEWORK: INDEPENDENT
GROCERY_SHOPPING: INDEPENDENT
MANAGING_FINANCES: INDEPENDENT
TAKING_MEDICATION: INDEPENDENT
BATHING: INDEPENDENT

## 2024-09-10 NOTE — ASSESSMENT & PLAN NOTE
On statin therapy  Will do lipids today but she did have some toast around 10 am   Orders:    Follow Up In Primary Care - Established    Comprehensive Metabolic Panel; Future    Lipid Panel; Future    Follow Up In Primary Care - Established; Future

## 2024-09-10 NOTE — ASSESSMENT & PLAN NOTE
Sees PMR and had RFA and epidural injections in the past   Has second opinion with spine specialist through [facility] coming up     Orders:    Follow Up In Primary Care - Established

## 2024-09-10 NOTE — PROGRESS NOTES
Subjective   Reason for Visit: Thea Kay is an 69 y.o. female here for a Medicare Wellness visit.     Past Medical, Surgical, and Family History reviewed and updated in chart.    Reviewed all medications by prescribing practitioner or clinical pharmacist (such as prescriptions, OTCs, herbal therapies and supplements) and documented in the medical record.    HPI  Pt here for MWV.  She is down 20 lbs since last year.  She is on semaglutide through a medispa.  She started it roughly 3 months ago.  She tells me about 4 days after her shot she will have fecal urgency and loose bowels/diarrhea for 1 day.      Last mammogram was in June of 2023 and had diagnostic due to abnormality noted.  Pathology from that was benign.  She has been getting emails from the breast center at [facility] to repeat mammogram.  She is seeing GYN-Dr. To  and is due to repeat now exam.  Her pap in 2021 was non diagnostic due to no cells noted.      She had osteopenia noted on dexa from June of 2023.  She is not taking calcium or vitamin D.  No falls.      Pt had positive Cologuard in past that led to colonoscopy in 2020.  She did have two polyps that were tubular adenoma's at that time.  Repeat noted for 5 years.      Mood is better on current medication.  Her sleep is fair due to her back pain.  She is on Meloxicam and diclofenac topical for joint issues.  She continues to see pain management and has had epidural injection back in 2/2024.  Over labor day weekend she did medrol dose pack which normally helps but this time it did not.  She did see Dr. Simms as well to discuss other treatment options-she is reluctant to have another procedure due to complications she had after her neck surgery.        Patient Care Team:  Ofe THOMAS DO as PCP - General  Ofe THOMAS DO as PCP - [facility] Attributed Provider     Review of Systems   Constitutional:  Negative for activity change, appetite change, chills, diaphoresis, fatigue, fever and  unexpected weight change.   HENT:  Positive for hearing loss. Negative for congestion, dental problem, drooling, ear discharge, ear pain, facial swelling, mouth sores, nosebleeds, postnasal drip, rhinorrhea, sinus pressure, sinus pain, sneezing, sore throat, tinnitus, trouble swallowing and voice change.    Eyes:  Negative for photophobia, pain, discharge, redness, itching and visual disturbance (wears glasses-up to date on exam).   Respiratory:  Negative for apnea, cough, choking, chest tightness, shortness of breath, wheezing and stridor.    Cardiovascular:  Negative for chest pain, palpitations and leg swelling.   Gastrointestinal:  Positive for diarrhea. Negative for abdominal distention, abdominal pain, anal bleeding, blood in stool, constipation, nausea, rectal pain and vomiting.   Endocrine: Negative for cold intolerance, heat intolerance, polydipsia, polyphagia and polyuria.   Genitourinary:  Negative for decreased urine volume, difficulty urinating, dyspareunia, dysuria, enuresis, flank pain, frequency, genital sores, hematuria, menstrual problem, pelvic pain, urgency, vaginal bleeding, vaginal discharge and vaginal pain.   Musculoskeletal:  Positive for back pain. Negative for arthralgias, gait problem, joint swelling, myalgias, neck pain and neck stiffness.   Skin:  Negative for color change, pallor, rash and wound.   Allergic/Immunologic: Negative for environmental allergies, food allergies and immunocompromised state.   Neurological:  Positive for numbness (right leg). Negative for dizziness, tremors, seizures, syncope, facial asymmetry, speech difficulty, weakness, light-headedness and headaches.   Hematological:  Negative for adenopathy. Does not bruise/bleed easily.   Psychiatric/Behavioral:  Positive for dysphoric mood. Negative for agitation, behavioral problems, confusion, decreased concentration, hallucinations, self-injury, sleep disturbance and suicidal ideas. The patient is not nervous/anxious  "and is not hyperactive.        Objective   Vitals:  /70 (BP Location: Left arm, Patient Position: Sitting)   Pulse 81   Temp 36.8 °C (98.3 °F)   Resp 18   Ht 1.607 m (5' 3.25\")   Wt 79.8 kg (175 lb 14.4 oz)   SpO2 96%   BMI 30.91 kg/m²       Physical Exam  Constitutional:       Appearance: Normal appearance. She is obese.   HENT:      Head: Normocephalic and atraumatic.      Right Ear: Tympanic membrane, ear canal and external ear normal. There is no impacted cerumen.      Left Ear: Tympanic membrane, ear canal and external ear normal. There is no impacted cerumen.      Nose: Nose normal. No congestion or rhinorrhea.      Mouth/Throat:      Mouth: Mucous membranes are moist.      Pharynx: Oropharynx is clear. No oropharyngeal exudate or posterior oropharyngeal erythema.   Eyes:      Extraocular Movements: Extraocular movements intact.      Conjunctiva/sclera: Conjunctivae normal.      Pupils: Pupils are equal, round, and reactive to light.   Neck:      Vascular: No carotid bruit.   Cardiovascular:      Rate and Rhythm: Normal rate and regular rhythm.      Pulses: Normal pulses.      Heart sounds: Normal heart sounds. No murmur heard.  Pulmonary:      Effort: Pulmonary effort is normal. No respiratory distress.      Breath sounds: Normal breath sounds. No wheezing, rhonchi or rales.   Abdominal:      General: Abdomen is flat. Bowel sounds are normal. There is no distension.      Palpations: Abdomen is soft.      Tenderness: There is no abdominal tenderness.      Hernia: No hernia is present.   Musculoskeletal:         General: Tenderness present. No swelling, deformity or signs of injury. Normal range of motion.      Cervical back: Normal range of motion and neck supple.      Right lower leg: No edema.      Left lower leg: No edema.      Comments: Pain of right hip/low back on right with abduction of hip    Lymphadenopathy:      Cervical: No cervical adenopathy.   Skin:     General: Skin is warm and dry. "      Findings: No lesion or rash.   Neurological:      General: No focal deficit present.      Mental Status: She is alert and oriented to person, place, and time.      Cranial Nerves: No cranial nerve deficit.      Sensory: No sensory deficit.      Motor: No weakness.   Psychiatric:         Mood and Affect: Mood normal.         Behavior: Behavior normal.         Thought Content: Thought content normal.         Judgment: Judgment normal.         Assessment & Plan  Mixed hyperlipidemia  On statin therapy  Will do lipids today but she did have some toast around 10 am   Orders:    Follow Up In Primary Care - Established    Comprehensive Metabolic Panel; Future    Lipid Panel; Future    Follow Up In Primary Care - Established; Future    Gastroesophageal reflux disease without esophagitis  On PPI therapy   Orders:    Follow Up In Primary Care - Established    Comprehensive Metabolic Panel; Future    CBC; Future    Follow Up In Primary Care - Established; Future    Depression with anxiety  Sees psych and is on high dose Wellbutrin   Orders:    Follow Up In Primary Care - Established    Attention deficit hyperactivity disorder (ADHD), predominantly inattentive type  On Adderall through our office which helps maintain focus/narcolepsy   New CSA done today  Unable to give urine today-will collect at next visit   Orders:    Follow Up In Primary Care - Established    amphetamine-dextroamphetamine (Adderall) 30 mg tablet; Take 1 tablet (30 mg) by mouth 2 times a day.    Follow Up In Primary Care - Established; Future    Degeneration of lumbar intervertebral disc  Sees PMR and had RFA and epidural injections in the past   Has second opinion with spine specialist through [facility] coming up     Orders:    Follow Up In Primary Care - Established    Medicare annual wellness visit, subsequent  Ordered mammogram-diagnostic as she is due for repeat  She got her flu shot today and will consider Covid booster in upcoming weeks         Class 1 obesity due to excess calories with serious comorbidity and body mass index (BMI) of 30.0 to 30.9 in adult  Pt down in weight with use of semaglutide from a med spa  Encouraged continued efforts of healthy diet and exercising regularly         Chronic bilateral low back pain with left-sided sciatica         Encounter for screening mammogram for malignant neoplasm of breast    Orders:    BI mammo bilateral diagnostic; Future    Abnormality of right breast on screening mammogram    Orders:    BI mammo bilateral diagnostic; Future    Dense breast    Orders:    BI mammo bilateral diagnostic; Future    Need for influenza vaccination    Orders:    Flu vaccine, trivalent, preservative free, HIGH-DOSE, age 65y+ (Fluzone)

## 2024-09-10 NOTE — ASSESSMENT & PLAN NOTE
Pt down in weight with use of semaglutide from a med spa  Encouraged continued efforts of healthy diet and exercising regularly

## 2024-09-10 NOTE — PATIENT INSTRUCTIONS
You got your flu shot today; consider newer covid booster in upcoming weeks if interested   Get labs done today and we will call with results  Continue all medications as directed  Continue weight loss efforts as you are doing-lean protein/fish/veggies and low carb/low sugar  Continue to stay active  See pain management as directed for ongoing back issues  Continue to see psychiatrist for mood  Follow up here in 6 months

## 2024-09-10 NOTE — ASSESSMENT & PLAN NOTE
On PPI therapy   Orders:    Follow Up In Primary Care - Established    Comprehensive Metabolic Panel; Future    CBC; Future    Follow Up In Primary Care - Established; Future

## 2024-10-18 DIAGNOSIS — F90.0 ATTENTION DEFICIT HYPERACTIVITY DISORDER (ADHD), PREDOMINANTLY INATTENTIVE TYPE: ICD-10-CM

## 2024-10-18 RX ORDER — DEXTROAMPHETAMINE SACCHARATE, AMPHETAMINE ASPARTATE, DEXTROAMPHETAMINE SULFATE AND AMPHETAMINE SULFATE 7.5; 7.5; 7.5; 7.5 MG/1; MG/1; MG/1; MG/1
30 TABLET ORAL 2 TIMES DAILY
Qty: 60 TABLET | Refills: 0 | Status: SHIPPED | OUTPATIENT
Start: 2024-10-18

## 2024-10-29 ENCOUNTER — HOSPITAL ENCOUNTER (OUTPATIENT)
Dept: RADIOLOGY | Facility: EXTERNAL LOCATION | Age: 69
Discharge: HOME | End: 2024-10-29

## 2024-11-14 ENCOUNTER — TELEPHONE (OUTPATIENT)
Dept: PRIMARY CARE | Facility: CLINIC | Age: 69
End: 2024-11-14
Payer: MEDICARE

## 2024-11-14 DIAGNOSIS — E78.2 MIXED HYPERLIPIDEMIA: ICD-10-CM

## 2024-11-14 NOTE — TELEPHONE ENCOUNTER
Called and informed patient on results of mammogram, patient understands and has no other questions/concerns right now. Encouraged patient to call us at the office or message us via [employer] if she needs anything else.

## 2024-11-15 RX ORDER — ATORVASTATIN CALCIUM 40 MG/1
40 TABLET, FILM COATED ORAL DAILY
Qty: 90 TABLET | Refills: 3 | Status: SHIPPED | OUTPATIENT
Start: 2024-11-15

## 2024-11-20 DIAGNOSIS — F90.0 ATTENTION DEFICIT HYPERACTIVITY DISORDER (ADHD), PREDOMINANTLY INATTENTIVE TYPE: ICD-10-CM

## 2024-11-20 RX ORDER — DEXTROAMPHETAMINE SACCHARATE, AMPHETAMINE ASPARTATE, DEXTROAMPHETAMINE SULFATE AND AMPHETAMINE SULFATE 7.5; 7.5; 7.5; 7.5 MG/1; MG/1; MG/1; MG/1
30 TABLET ORAL 2 TIMES DAILY
Qty: 60 TABLET | Refills: 0 | Status: SHIPPED | OUTPATIENT
Start: 2024-11-20

## 2024-12-03 DIAGNOSIS — F41.8 DEPRESSION WITH ANXIETY: ICD-10-CM

## 2024-12-03 NOTE — TELEPHONE ENCOUNTER
Patient called requesting refill on Wellbutrin both 150 and 300 mg tablets to be sent to [facility] [place], she was also wondering if you can send in a prescription of Celebrex 200mg she used to take this but is all out and she has been having some lower back pain. - Thank you.

## 2024-12-04 RX ORDER — BUPROPION HYDROCHLORIDE 150 MG/1
150 TABLET ORAL DAILY
Qty: 90 TABLET | Refills: 3 | Status: SHIPPED | OUTPATIENT
Start: 2024-12-04

## 2024-12-04 RX ORDER — BUPROPION HYDROCHLORIDE 300 MG/1
300 TABLET ORAL DAILY
Qty: 90 TABLET | Refills: 3 | Status: SHIPPED | OUTPATIENT
Start: 2024-12-04

## 2024-12-13 DIAGNOSIS — M54.2 CHRONIC NECK PAIN: ICD-10-CM

## 2024-12-13 DIAGNOSIS — G89.29 CHRONIC NECK PAIN: ICD-10-CM

## 2024-12-16 RX ORDER — MELOXICAM 15 MG/1
TABLET ORAL
Qty: 90 TABLET | Refills: 3 | Status: SHIPPED | OUTPATIENT
Start: 2024-12-16

## 2024-12-17 DIAGNOSIS — F90.0 ATTENTION DEFICIT HYPERACTIVITY DISORDER (ADHD), PREDOMINANTLY INATTENTIVE TYPE: ICD-10-CM

## 2024-12-17 RX ORDER — DEXTROAMPHETAMINE SACCHARATE, AMPHETAMINE ASPARTATE, DEXTROAMPHETAMINE SULFATE AND AMPHETAMINE SULFATE 7.5; 7.5; 7.5; 7.5 MG/1; MG/1; MG/1; MG/1
30 TABLET ORAL 2 TIMES DAILY
Qty: 60 TABLET | Refills: 0 | Status: SHIPPED | OUTPATIENT
Start: 2024-12-17

## 2024-12-17 NOTE — TELEPHONE ENCOUNTER
Patient would also like a prescription for Vitamin D.  She said she takes higher dose during the winter months and wants it called into pharmacy so insurance will cover.

## 2024-12-20 DIAGNOSIS — K21.9 GASTROESOPHAGEAL REFLUX DISEASE, UNSPECIFIED WHETHER ESOPHAGITIS PRESENT: ICD-10-CM

## 2024-12-23 RX ORDER — OMEPRAZOLE 20 MG/1
CAPSULE, DELAYED RELEASE ORAL
Qty: 90 CAPSULE | Refills: 3 | Status: SHIPPED | OUTPATIENT
Start: 2024-12-23

## 2024-12-30 DIAGNOSIS — M51.369 DEGENERATION OF LUMBAR INTERVERTEBRAL DISC: ICD-10-CM

## 2024-12-30 RX ORDER — CYCLOBENZAPRINE HCL 10 MG
10 TABLET ORAL 2 TIMES DAILY PRN
Qty: 180 TABLET | Refills: 0 | Status: SHIPPED | OUTPATIENT
Start: 2024-12-30

## 2025-01-21 DIAGNOSIS — F41.8 DEPRESSION WITH ANXIETY: ICD-10-CM

## 2025-01-21 RX ORDER — BUPROPION HYDROCHLORIDE 150 MG/1
150 TABLET ORAL DAILY
Qty: 90 TABLET | Refills: 3 | Status: SHIPPED | OUTPATIENT
Start: 2025-01-21

## 2025-02-07 DIAGNOSIS — F90.0 ATTENTION DEFICIT HYPERACTIVITY DISORDER (ADHD), PREDOMINANTLY INATTENTIVE TYPE: ICD-10-CM

## 2025-02-07 RX ORDER — DEXTROAMPHETAMINE SACCHARATE, AMPHETAMINE ASPARTATE, DEXTROAMPHETAMINE SULFATE AND AMPHETAMINE SULFATE 7.5; 7.5; 7.5; 7.5 MG/1; MG/1; MG/1; MG/1
30 TABLET ORAL 2 TIMES DAILY
Qty: 60 TABLET | Refills: 0 | Status: SHIPPED | OUTPATIENT
Start: 2025-02-07

## 2025-03-07 DIAGNOSIS — E28.319 PREMATURE MENOPAUSE: ICD-10-CM

## 2025-03-10 RX ORDER — PROGESTERONE 200 MG/1
CAPSULE ORAL
Qty: 90 CAPSULE | Refills: 3 | Status: SHIPPED | OUTPATIENT
Start: 2025-03-10

## 2025-03-11 ENCOUNTER — APPOINTMENT (OUTPATIENT)
Dept: PRIMARY CARE | Facility: CLINIC | Age: 70
End: 2025-03-11
Payer: MEDICARE

## 2025-03-19 ENCOUNTER — APPOINTMENT (OUTPATIENT)
Dept: PRIMARY CARE | Facility: CLINIC | Age: 70
End: 2025-03-19
Payer: MEDICARE

## 2025-03-19 VITALS
BODY MASS INDEX: 28.65 KG/M2 | HEART RATE: 85 BPM | RESPIRATION RATE: 17 BRPM | DIASTOLIC BLOOD PRESSURE: 60 MMHG | WEIGHT: 163 LBS | SYSTOLIC BLOOD PRESSURE: 94 MMHG

## 2025-03-19 DIAGNOSIS — F40.243 FEAR OF FLYING: ICD-10-CM

## 2025-03-19 DIAGNOSIS — Z79.899 MEDICATION MANAGEMENT: ICD-10-CM

## 2025-03-19 DIAGNOSIS — K21.9 GASTROESOPHAGEAL REFLUX DISEASE WITHOUT ESOPHAGITIS: ICD-10-CM

## 2025-03-19 DIAGNOSIS — G89.29 CHRONIC BILATERAL LOW BACK PAIN WITH LEFT-SIDED SCIATICA: ICD-10-CM

## 2025-03-19 DIAGNOSIS — M54.42 CHRONIC BILATERAL LOW BACK PAIN WITH LEFT-SIDED SCIATICA: ICD-10-CM

## 2025-03-19 DIAGNOSIS — F41.8 DEPRESSION WITH ANXIETY: ICD-10-CM

## 2025-03-19 DIAGNOSIS — F90.0 ATTENTION DEFICIT HYPERACTIVITY DISORDER (ADHD), PREDOMINANTLY INATTENTIVE TYPE: Primary | ICD-10-CM

## 2025-03-19 PROCEDURE — 99214 OFFICE O/P EST MOD 30 MIN: CPT | Performed by: INTERNAL MEDICINE

## 2025-03-19 PROCEDURE — 1160F RVW MEDS BY RX/DR IN RCRD: CPT | Performed by: INTERNAL MEDICINE

## 2025-03-19 PROCEDURE — 1157F ADVNC CARE PLAN IN RCRD: CPT | Performed by: INTERNAL MEDICINE

## 2025-03-19 PROCEDURE — 1159F MED LIST DOCD IN RCRD: CPT | Performed by: INTERNAL MEDICINE

## 2025-03-19 PROCEDURE — 1123F ACP DISCUSS/DSCN MKR DOCD: CPT | Performed by: INTERNAL MEDICINE

## 2025-03-19 RX ORDER — LORAZEPAM 1 MG/1
TABLET ORAL
Qty: 4 TABLET | Refills: 0 | Status: SHIPPED | OUTPATIENT
Start: 2025-03-19

## 2025-03-19 RX ORDER — DEXTROAMPHETAMINE SACCHARATE, AMPHETAMINE ASPARTATE, DEXTROAMPHETAMINE SULFATE AND AMPHETAMINE SULFATE 7.5; 7.5; 7.5; 7.5 MG/1; MG/1; MG/1; MG/1
30 TABLET ORAL 2 TIMES DAILY
Qty: 60 TABLET | Refills: 0 | Status: SHIPPED | OUTPATIENT
Start: 2025-03-19

## 2025-03-19 ASSESSMENT — ENCOUNTER SYMPTOMS
BACK PAIN: 1
PALPITATIONS: 0
ABDOMINAL PAIN: 0
WHEEZING: 0
VOMITING: 0
COUGH: 0
DIARRHEA: 0
APPETITE CHANGE: 0
DECREASED CONCENTRATION: 0
NAUSEA: 0
FATIGUE: 0
FEVER: 0
CHILLS: 0
SLEEP DISTURBANCE: 0
NECK PAIN: 1
UNEXPECTED WEIGHT CHANGE: 0
CONFUSION: 0
SHORTNESS OF BREATH: 0
CHEST TIGHTNESS: 0
LIGHT-HEADEDNESS: 0
ACTIVITY CHANGE: 0
CONSTIPATION: 0
HEADACHES: 0
NERVOUS/ANXIOUS: 0
DYSPHORIC MOOD: 1
DIZZINESS: 0

## 2025-03-19 NOTE — PROGRESS NOTES
Subjective   Patient ID: Thea Kay is a 70 y.o. female who presents for Follow-up (6m follow-up.  Patient is taking semiglutide medication that she gets online. ).    HPI  Pt here in 6 month follow up.  She is down almost 30 lbs since last year-she is on compound semaglutide.  She is currently on a 30 mg dose per patient.    She does have diarrhea from use.      She continues on Adderall 30 mg twice a day with good results.  She is able to stay focus/concentrate.      She is on Wellbutrin 450 mg/day.  She is however feeling some blah/black cloud like feeling.  Her brother has dementia and spent 6 months with them.  She is dealing with her 91 yo mother.      She recently saw podiatrist and was noted to have OA of feet/toes.   She was told to wear better supporting shoes.      OARRS:  Ofe THOMAS DO on 3/19/2025  2:28 PM  I have personally reviewed the OARRS report for Thea Kay. I have considered the risks of abuse, dependence, addiction and diversion    Is the patient prescribed a combination of a benzodiazepine and opioid?  No    Last Urine Drug Screen / ordered today: Yes  No results found for this or any previous visit (from the past 8760 hours).  N/A    Clinical rationale for not completing a Urine Drug Screen: hasn't been able to provide urine-will order today       Controlled Substance Agreement:  Date of the Last Agreement: 09/10/2024  Reviewed Controlled Substance Agreement including but not limited to the benefits, risks, and alternatives to treatment with a Controlled Substance medication(s).    Stimulants:   What is the patient's goal of therapy? Focus/attention  Is this being achieved with current treatment? yes    Activities of Daily Living:   Is your overall impression that this patient is benefiting (symptom reduction outweighs side effects) from stimulant therapy? Yes     1. Physical Functioning: Better  2. Family Relationship: Better  3. Social Relationship: Better  4. Mood: Same  5. Sleep  Patterns: Better  6. Overall Function: Better      BP 94/60 (BP Location: Left arm, Patient Position: Sitting)   Pulse 85   Resp 17   Wt 73.9 kg (163 lb)   BMI 28.65 kg/m²      Review of Systems   Constitutional:  Negative for activity change, appetite change, chills, fatigue, fever and unexpected weight change.   Respiratory:  Negative for cough, chest tightness, shortness of breath and wheezing.    Cardiovascular:  Negative for chest pain, palpitations and leg swelling.   Gastrointestinal:  Negative for abdominal pain, constipation, diarrhea, nausea and vomiting.   Musculoskeletal:  Positive for back pain and neck pain.   Neurological:  Negative for dizziness, light-headedness and headaches.   Psychiatric/Behavioral:  Positive for dysphoric mood. Negative for confusion, decreased concentration and sleep disturbance. The patient is not nervous/anxious.        Objective   Physical Exam  Constitutional:       Appearance: Normal appearance.   Cardiovascular:      Rate and Rhythm: Normal rate and regular rhythm.      Heart sounds: Normal heart sounds.   Pulmonary:      Effort: Pulmonary effort is normal.      Breath sounds: Normal breath sounds.   Abdominal:      General: Bowel sounds are normal.      Palpations: Abdomen is soft.   Musculoskeletal:      Right lower leg: No edema.      Left lower leg: No edema.   Lymphadenopathy:      Cervical: No cervical adenopathy.   Neurological:      Mental Status: She is alert and oriented to person, place, and time.   Psychiatric:         Mood and Affect: Mood normal.         Assessment/Plan   Problem List Items Addressed This Visit             ICD-10-CM    Attention deficit hyperactivity disorder (ADHD), predominantly inattentive type - Primary F90.0     On adderall-refill provided  OARRS done, CSA present  Need urine today for tox check          Relevant Medications    amphetamine-dextroamphetamine (Adderall) 30 mg tablet    Other Relevant Orders    Drug Screen, Urine With  Reflex to Confirmation    Amphetamine Confirm, Urine    Follow Up In Primary Care - Medicare Annual    Chronic bilateral low back pain with left-sided sciatica M54.42, G89.29     Continues to have chronic neck/back pain           Relevant Orders    Follow Up In Primary Care - Medicare Annual    Depression with anxiety F41.8     Pt feeling a bit more blah  She is on max dose of Wellbutrin  Has some stress with elderly mother and brother with dementia  Taking flights to visit family coming up-needs med to help relax-4 Lorazepam given          Relevant Orders    Follow Up In Primary Care - Medicare Annual    GERD (gastroesophageal reflux disease) K21.9     Other Visit Diagnoses         Codes    Medication management     Z79.899    Relevant Orders    Drug Screen, Urine With Reflex to Confirmation    Amphetamine Confirm, Urine    Follow Up In Primary Care - Medicare Annual    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

## 2025-03-19 NOTE — ASSESSMENT & PLAN NOTE
Pt feeling a bit more blah  She is on max dose of Wellbutrin  Has some stress with elderly mother and brother with dementia  Taking flights to visit family coming up-needs med to help relax-4 Lorazepam given

## 2025-03-19 NOTE — PATIENT INSTRUCTIONS
We will get urine today for use of Adderall  Use Lorazepam 1 mg 1 hour prior to flight; if still nervous during flight can take another 1/2-1 tablet during mid flight  Continue other meds as directed  Monitor mood-if still feeling poorly then let me know we can adjust meds if necessary  Follow up here in 6 months for full physical (come fasting for blood work)

## 2025-03-21 DIAGNOSIS — F90.0 ATTENTION DEFICIT HYPERACTIVITY DISORDER (ADHD), PREDOMINANTLY INATTENTIVE TYPE: ICD-10-CM

## 2025-03-21 RX ORDER — DEXTROAMPHETAMINE SACCHARATE, AMPHETAMINE ASPARTATE, DEXTROAMPHETAMINE SULFATE AND AMPHETAMINE SULFATE 7.5; 7.5; 7.5; 7.5 MG/1; MG/1; MG/1; MG/1
30 TABLET ORAL 2 TIMES DAILY
Qty: 60 TABLET | Refills: 0 | OUTPATIENT
Start: 2025-03-21

## 2025-03-22 LAB
AMPHET UR-MCNC: ABNORMAL NG/ML
AMPHET UR-MCNC: NORMAL NG/ML
AMPHETAMINES UR QL: POSITIVE NG/ML
BARBITURATES UR QL: NEGATIVE NG/ML
BENZODIAZ UR QL: NEGATIVE NG/ML
BZE UR QL: NEGATIVE NG/ML
CREAT UR-MCNC: 157.1 MG/DL
DRUG SCREEN COMMENT UR-IMP: ABNORMAL
MDA UR-MCNC: NORMAL UG/ML
MDEA UR-MCNC: NORMAL NG/ML
MDMA UR-MCNC: NORMAL NG/ML
METHADONE UR QL: NEGATIVE NG/ML
METHAMPHET UR-MCNC: NEGATIVE NG/ML
METHAMPHET UR-MCNC: NORMAL UG/ML
OPIATES UR QL: NEGATIVE NG/ML
OXIDANTS UR QL: NEGATIVE MCG/ML
OXYCODONE UR QL: NEGATIVE NG/ML
PCP UR QL: NEGATIVE NG/ML
PH UR: 5.2 [PH] (ref 4.5–9)
PHENTERMINE UR-MCNC: NORMAL UG/ML
QUEST NOTES AND COMMENTS: ABNORMAL
THC UR QL: NEGATIVE NG/ML

## 2025-03-25 LAB
AMPHET UR-MCNC: >4000 NG/ML
AMPHET UR-MCNC: ABNORMAL NG/ML
AMPHETAMINES UR QL: POSITIVE NG/ML
BARBITURATES UR QL: NEGATIVE NG/ML
BENZODIAZ UR QL: NEGATIVE NG/ML
BZE UR QL: NEGATIVE NG/ML
CREAT UR-MCNC: 157.1 MG/DL
DRUG SCREEN COMMENT UR-IMP: ABNORMAL
MDA UR-MCNC: NEGATIVE NG/ML
MDEA UR-MCNC: NEGATIVE NG/ML
MDMA UR-MCNC: NEGATIVE NG/ML
METHADONE UR QL: NEGATIVE NG/ML
METHAMPHET UR-MCNC: NEGATIVE NG/ML
METHAMPHET UR-MCNC: NEGATIVE NG/ML
OPIATES UR QL: NEGATIVE NG/ML
OXIDANTS UR QL: NEGATIVE MCG/ML
OXYCODONE UR QL: NEGATIVE NG/ML
PCP UR QL: NEGATIVE NG/ML
PH UR: 5.2 [PH] (ref 4.5–9)
PHENTERMINE UR-MCNC: NEGATIVE NG/ML
QUEST NOTES AND COMMENTS: ABNORMAL
THC UR QL: NEGATIVE NG/ML

## 2025-04-05 DIAGNOSIS — M51.369 DEGENERATION OF LUMBAR INTERVERTEBRAL DISC: ICD-10-CM

## 2025-04-07 RX ORDER — CYCLOBENZAPRINE HCL 10 MG
10 TABLET ORAL 2 TIMES DAILY PRN
Qty: 180 TABLET | Refills: 1 | Status: SHIPPED | OUTPATIENT
Start: 2025-04-07

## 2025-04-25 DIAGNOSIS — F90.0 ATTENTION DEFICIT HYPERACTIVITY DISORDER (ADHD), PREDOMINANTLY INATTENTIVE TYPE: ICD-10-CM

## 2025-04-25 RX ORDER — DEXTROAMPHETAMINE SACCHARATE, AMPHETAMINE ASPARTATE, DEXTROAMPHETAMINE SULFATE AND AMPHETAMINE SULFATE 7.5; 7.5; 7.5; 7.5 MG/1; MG/1; MG/1; MG/1
30 TABLET ORAL 2 TIMES DAILY
Qty: 60 TABLET | Refills: 0 | Status: SHIPPED | OUTPATIENT
Start: 2025-04-25

## 2025-04-25 NOTE — TELEPHONE ENCOUNTER
Thea says the round adderall pills give her migraines and the oval ones don't. She wants TEVA brand adderall specifically if possible.

## 2025-05-01 DIAGNOSIS — F41.8 DEPRESSION WITH ANXIETY: ICD-10-CM

## 2025-05-01 RX ORDER — BUPROPION HYDROCHLORIDE 150 MG/1
150 TABLET ORAL DAILY
Qty: 90 TABLET | Refills: 3 | Status: SHIPPED | OUTPATIENT
Start: 2025-05-01

## 2025-05-01 NOTE — TELEPHONE ENCOUNTER
Script already sent for wellbutrins--2 dosages. [facility] in [facility] and 150 was sent to [facility] in [facility]. Please confirm where both should be sent. She can also do a rx transfer from pharmacy to pharmacy instead of us discontinuing the order at one and resending it to the other

## 2025-05-01 NOTE — TELEPHONE ENCOUNTER
Patient called to say she needs the 150mg of [facility]rin [facility] and not [facility], where it was previously sent to. [facility] wants [facility].

## 2025-06-10 DIAGNOSIS — F90.0 ATTENTION DEFICIT HYPERACTIVITY DISORDER (ADHD), PREDOMINANTLY INATTENTIVE TYPE: ICD-10-CM

## 2025-06-10 RX ORDER — DEXTROAMPHETAMINE SACCHARATE, AMPHETAMINE ASPARTATE, DEXTROAMPHETAMINE SULFATE AND AMPHETAMINE SULFATE 7.5; 7.5; 7.5; 7.5 MG/1; MG/1; MG/1; MG/1
30 TABLET ORAL 2 TIMES DAILY
Qty: 60 TABLET | Refills: 0 | Status: SHIPPED | OUTPATIENT
Start: 2025-06-10

## 2025-07-18 DIAGNOSIS — F90.0 ATTENTION DEFICIT HYPERACTIVITY DISORDER (ADHD), PREDOMINANTLY INATTENTIVE TYPE: ICD-10-CM

## 2025-07-20 RX ORDER — DEXTROAMPHETAMINE SACCHARATE, AMPHETAMINE ASPARTATE, DEXTROAMPHETAMINE SULFATE AND AMPHETAMINE SULFATE 7.5; 7.5; 7.5; 7.5 MG/1; MG/1; MG/1; MG/1
30 TABLET ORAL 2 TIMES DAILY
Qty: 60 TABLET | Refills: 0 | Status: SHIPPED | OUTPATIENT
Start: 2025-07-20

## 2025-08-19 DIAGNOSIS — F90.0 ATTENTION DEFICIT HYPERACTIVITY DISORDER (ADHD), PREDOMINANTLY INATTENTIVE TYPE: ICD-10-CM

## 2025-08-19 RX ORDER — DEXTROAMPHETAMINE SACCHARATE, AMPHETAMINE ASPARTATE, DEXTROAMPHETAMINE SULFATE AND AMPHETAMINE SULFATE 7.5; 7.5; 7.5; 7.5 MG/1; MG/1; MG/1; MG/1
30 TABLET ORAL 2 TIMES DAILY
Qty: 60 TABLET | Refills: 0 | Status: SHIPPED | OUTPATIENT
Start: 2025-08-19

## 2025-09-25 ENCOUNTER — APPOINTMENT (OUTPATIENT)
Dept: PRIMARY CARE | Facility: CLINIC | Age: 70
End: 2025-09-25
Payer: MEDICARE